# Patient Record
Sex: FEMALE | Race: WHITE | NOT HISPANIC OR LATINO | Employment: FULL TIME | URBAN - METROPOLITAN AREA
[De-identification: names, ages, dates, MRNs, and addresses within clinical notes are randomized per-mention and may not be internally consistent; named-entity substitution may affect disease eponyms.]

---

## 2023-05-01 ENCOUNTER — TELEPHONE (OUTPATIENT)
Dept: PRIMARY CARE | Facility: CLINIC | Age: 48
End: 2023-05-01
Payer: MEDICAID

## 2023-05-01 NOTE — TELEPHONE ENCOUNTER
She called in she is having a lot of left side pain around her hip and into to her back she also states that her cousin was diagnosed with Stage 3 cervical cancer. She also stated that she was on the depo shot went off of it awhile after she got a blood clot in her lungs. She wanting to know if there is any test we can order for. Please call her at [phone]

## 2023-05-17 PROBLEM — M54.9 ACUTE BACK PAIN: Status: RESOLVED | Noted: 2023-05-17 | Resolved: 2023-05-17

## 2023-05-17 PROBLEM — E66.01 MORBID OBESITY (MULTI): Status: RESOLVED | Noted: 2023-05-17 | Resolved: 2023-05-17

## 2023-05-17 PROBLEM — E55.9 VITAMIN D DEFICIENCY: Status: ACTIVE | Noted: 2023-05-17

## 2023-05-17 PROBLEM — M54.2 ACUTE NECK PAIN: Status: RESOLVED | Noted: 2023-05-17 | Resolved: 2023-05-17

## 2023-05-17 PROBLEM — R63.5 WEIGHT GAIN: Status: RESOLVED | Noted: 2023-05-17 | Resolved: 2023-05-17

## 2023-05-17 PROBLEM — E53.8 VITAMIN B12 DEFICIENCY: Status: ACTIVE | Noted: 2023-05-17

## 2023-05-17 PROBLEM — I26.99 PULMONARY EMBOLISM (MULTI): Status: RESOLVED | Noted: 2023-05-17 | Resolved: 2023-05-17

## 2023-05-17 PROBLEM — N91.2 AMENORRHEA: Status: RESOLVED | Noted: 2023-05-17 | Resolved: 2023-05-17

## 2023-05-17 PROBLEM — E88.819 INSULIN RESISTANCE: Status: ACTIVE | Noted: 2023-05-17

## 2023-05-17 PROBLEM — I89.0 LYMPHEDEMA: Status: ACTIVE | Noted: 2023-05-17

## 2023-05-17 PROBLEM — I10 BENIGN ESSENTIAL HYPERTENSION: Status: ACTIVE | Noted: 2023-05-17

## 2023-05-17 PROBLEM — E78.00 ELEVATED LDL CHOLESTEROL LEVEL: Status: ACTIVE | Noted: 2023-05-17

## 2023-05-17 PROBLEM — I82.409 DVT (DEEP VENOUS THROMBOSIS) (MULTI): Status: ACTIVE | Noted: 2023-05-17

## 2023-05-17 PROBLEM — R63.2 POLYPHAGIA: Status: RESOLVED | Noted: 2023-05-17 | Resolved: 2023-05-17

## 2023-05-17 PROBLEM — R10.2 PELVIC PAIN: Status: RESOLVED | Noted: 2023-05-17 | Resolved: 2023-05-17

## 2023-05-17 PROBLEM — R42 LIGHTHEADEDNESS: Status: ACTIVE | Noted: 2023-05-17

## 2023-05-17 PROBLEM — R06.02 SHORTNESS OF BREATH AT REST: Status: RESOLVED | Noted: 2023-05-17 | Resolved: 2023-05-17

## 2023-05-17 PROBLEM — I47.19 ATRIAL TACHYCARDIA (CMS-HCC): Status: RESOLVED | Noted: 2023-05-17 | Resolved: 2023-05-17

## 2023-05-17 PROBLEM — R05.9 COUGH: Status: RESOLVED | Noted: 2023-05-17 | Resolved: 2023-05-17

## 2023-05-17 PROBLEM — F41.1 GENERALIZED ANXIETY DISORDER: Status: ACTIVE | Noted: 2023-05-17

## 2023-05-17 RX ORDER — CYCLOBENZAPRINE HCL 5 MG
1 TABLET ORAL NIGHTLY PRN
COMMUNITY
Start: 2022-11-21 | End: 2023-05-23 | Stop reason: SDUPTHER

## 2023-05-17 RX ORDER — METOPROLOL TARTRATE 25 MG/1
1 TABLET, FILM COATED ORAL 2 TIMES DAILY
COMMUNITY
Start: 2023-01-31 | End: 2023-07-12 | Stop reason: SDUPTHER

## 2023-05-17 RX ORDER — LOSARTAN POTASSIUM 25 MG/1
1 TABLET ORAL DAILY
COMMUNITY
Start: 2023-01-31 | End: 2023-07-12 | Stop reason: SDUPTHER

## 2023-05-17 RX ORDER — HYDROCHLOROTHIAZIDE 12.5 MG/1
1 CAPSULE ORAL DAILY
COMMUNITY
Start: 2023-01-31 | End: 2023-07-12 | Stop reason: SDUPTHER

## 2023-05-17 RX ORDER — APIXABAN 5 MG/1
1 TABLET, FILM COATED ORAL 2 TIMES DAILY
COMMUNITY
End: 2023-07-14 | Stop reason: SDUPTHER

## 2023-05-17 RX ORDER — AMIODARONE HYDROCHLORIDE 200 MG/1
200 TABLET ORAL DAILY
COMMUNITY
Start: 2023-03-13 | End: 2023-12-11 | Stop reason: ALTCHOICE

## 2023-05-17 RX ORDER — FLECAINIDE ACETATE 100 MG/1
100 TABLET ORAL EVERY 12 HOURS
COMMUNITY
End: 2023-12-11

## 2023-05-17 RX ORDER — BUPROPION HYDROCHLORIDE 150 MG/1
150 TABLET ORAL EVERY MORNING
COMMUNITY
Start: 2022-01-10 | End: 2023-05-23

## 2023-05-17 RX ORDER — METFORMIN HYDROCHLORIDE 500 MG/1
1 TABLET ORAL DAILY
COMMUNITY
Start: 2022-11-15 | End: 2023-05-23

## 2023-05-23 ENCOUNTER — OFFICE VISIT (OUTPATIENT)
Dept: PRIMARY CARE | Facility: CLINIC | Age: 48
End: 2023-05-23
Payer: MEDICAID

## 2023-05-23 VITALS
HEART RATE: 66 BPM | DIASTOLIC BLOOD PRESSURE: 82 MMHG | HEIGHT: 64 IN | SYSTOLIC BLOOD PRESSURE: 120 MMHG | WEIGHT: 281 LBS | BODY MASS INDEX: 47.97 KG/M2

## 2023-05-23 DIAGNOSIS — G89.29 CHRONIC BACK PAIN, UNSPECIFIED BACK LOCATION, UNSPECIFIED BACK PAIN LATERALITY: ICD-10-CM

## 2023-05-23 DIAGNOSIS — E78.00 ELEVATED LDL CHOLESTEROL LEVEL: ICD-10-CM

## 2023-05-23 DIAGNOSIS — E53.8 VITAMIN B12 DEFICIENCY: ICD-10-CM

## 2023-05-23 DIAGNOSIS — M54.9 CHRONIC BACK PAIN, UNSPECIFIED BACK LOCATION, UNSPECIFIED BACK PAIN LATERALITY: ICD-10-CM

## 2023-05-23 DIAGNOSIS — F41.1 GENERALIZED ANXIETY DISORDER: Primary | ICD-10-CM

## 2023-05-23 DIAGNOSIS — E88.819 INSULIN RESISTANCE: ICD-10-CM

## 2023-05-23 DIAGNOSIS — Z12.11 COLON CANCER SCREENING: ICD-10-CM

## 2023-05-23 DIAGNOSIS — E55.9 VITAMIN D DEFICIENCY: ICD-10-CM

## 2023-05-23 DIAGNOSIS — I10 BENIGN ESSENTIAL HYPERTENSION: ICD-10-CM

## 2023-05-23 DIAGNOSIS — I89.0 LYMPHEDEMA: ICD-10-CM

## 2023-05-23 DIAGNOSIS — I82.4Y9 DEEP VEIN THROMBOSIS (DVT) OF PROXIMAL LOWER EXTREMITY, UNSPECIFIED CHRONICITY, UNSPECIFIED LATERALITY (MULTI): ICD-10-CM

## 2023-05-23 PROCEDURE — 99214 OFFICE O/P EST MOD 30 MIN: CPT | Performed by: CLINICAL NURSE SPECIALIST

## 2023-05-23 PROCEDURE — 3074F SYST BP LT 130 MM HG: CPT | Performed by: CLINICAL NURSE SPECIALIST

## 2023-05-23 PROCEDURE — 3008F BODY MASS INDEX DOCD: CPT | Performed by: CLINICAL NURSE SPECIALIST

## 2023-05-23 PROCEDURE — 3079F DIAST BP 80-89 MM HG: CPT | Performed by: CLINICAL NURSE SPECIALIST

## 2023-05-23 RX ORDER — BUPROPION HYDROCHLORIDE 300 MG/1
300 TABLET ORAL EVERY MORNING
Qty: 30 TABLET | Refills: 5 | Status: SHIPPED | OUTPATIENT
Start: 2023-05-23 | End: 2023-07-12 | Stop reason: SDUPTHER

## 2023-05-23 RX ORDER — METFORMIN HYDROCHLORIDE 500 MG/1
500 TABLET ORAL
Qty: 90 TABLET | Refills: 0 | Status: SHIPPED | OUTPATIENT
Start: 2023-05-23 | End: 2023-07-12 | Stop reason: SDUPTHER

## 2023-05-23 RX ORDER — CYCLOBENZAPRINE HCL 5 MG
5 TABLET ORAL NIGHTLY PRN
Qty: 30 TABLET | Refills: 2 | Status: SHIPPED | OUTPATIENT
Start: 2023-05-23 | End: 2023-08-04

## 2023-05-23 ASSESSMENT — ENCOUNTER SYMPTOMS
SORE THROAT: 0
DEPRESSION: 1
NECK PAIN: 0
OCCASIONAL FEELINGS OF UNSTEADINESS: 0
HEMATURIA: 0
LOSS OF SENSATION IN FEET: 0
SHORTNESS OF BREATH: 0
POLYDIPSIA: 0
NAUSEA: 0
FLANK PAIN: 0
TROUBLE SWALLOWING: 0
WOUND: 0
BRUISES/BLEEDS EASILY: 0
PHOTOPHOBIA: 0
CHEST TIGHTNESS: 0
SLEEP DISTURBANCE: 0
MYALGIAS: 0
CHILLS: 0
ABDOMINAL PAIN: 0
JOINT SWELLING: 0
EYE PAIN: 0
COUGH: 0
CONSTIPATION: 0
CONFUSION: 0
PALPITATIONS: 0
FEVER: 0
FATIGUE: 0
DIARRHEA: 0
UNEXPECTED WEIGHT CHANGE: 0
APPETITE CHANGE: 0
DYSURIA: 0
VOMITING: 0
SEIZURES: 0
HEADACHES: 0
BLOOD IN STOOL: 0
ACTIVITY CHANGE: 0
WHEEZING: 0
DIZZINESS: 0

## 2023-05-23 ASSESSMENT — PATIENT HEALTH QUESTIONNAIRE - PHQ9
2. FEELING DOWN, DEPRESSED OR HOPELESS: NOT AT ALL
SUM OF ALL RESPONSES TO PHQ9 QUESTIONS 1 AND 2: 1
1. LITTLE INTEREST OR PLEASURE IN DOING THINGS: NOT AT ALL
SUM OF ALL RESPONSES TO PHQ9 QUESTIONS 1 AND 2: 0
2. FEELING DOWN, DEPRESSED OR HOPELESS: SEVERAL DAYS
1. LITTLE INTEREST OR PLEASURE IN DOING THINGS: NOT AT ALL

## 2023-05-23 ASSESSMENT — COLUMBIA-SUICIDE SEVERITY RATING SCALE - C-SSRS
6. HAVE YOU EVER DONE ANYTHING, STARTED TO DO ANYTHING, OR PREPARED TO DO ANYTHING TO END YOUR LIFE?: NO
2. HAVE YOU ACTUALLY HAD ANY THOUGHTS OF KILLING YOURSELF?: NO
1. IN THE PAST MONTH, HAVE YOU WISHED YOU WERE DEAD OR WISHED YOU COULD GO TO SLEEP AND NOT WAKE UP?: NO

## 2023-05-23 NOTE — PROGRESS NOTES
Subjective   Patient ID: Kerry Garcia is a 47 y.o. female who presents for Follow-up (Follow up/Discuss lower abdominal pain. Patient states its on the left side around back on going. Ultrasound was done and it was normal ).  HPI    Here today as a follow up for her Hypertension. Has been consistent with her Losartan/HCTZ.     Still working on weight loss. No longer going to [employer]. Has been trying to work on her diet. Chronic issues with struggling to lose weight. Stopped eating fast food. Followed with Bariatrics, not sure she is going to follow back with her. Has continued her Metformin.     She has chronic issues with Peripheral Edema, previously was on Lasix but states that it made her urinate too much. Did not feel that it was beneficial for her. Denies any complaints of chest pain or shortness of breath. Lymphedema, followed with Rehab and Vascular.     Mother previously diagnosed in her 40's with Colon Cancer, Father with Polyps removed. Patient declined having a Colonoscopy done. Agreeable to Cologuard. Did not have done as ordered.     Would like to increase her Wellbutrin, increased Anxiety.     Back Pain, no improvement with OTC medications. No imaging has been completed.     Review of Systems   Constitutional:  Negative for activity change, appetite change, chills, fatigue, fever and unexpected weight change.   HENT:  Negative for ear pain, hearing loss, nosebleeds, sore throat, tinnitus and trouble swallowing.    Eyes:  Negative for photophobia, pain and visual disturbance.   Respiratory:  Negative for cough, chest tightness, shortness of breath and wheezing.    Cardiovascular:  Positive for leg swelling. Negative for chest pain and palpitations.   Gastrointestinal:  Negative for abdominal pain, blood in stool, constipation, diarrhea, nausea and vomiting.   Endocrine: Negative for cold intolerance, heat intolerance, polydipsia and polyuria.   Genitourinary:  Negative for dysuria, flank pain  and hematuria.   Musculoskeletal:  Positive for back pain. Negative for arthralgias, joint swelling, myalgias and neck pain.   Skin:  Negative for pallor, rash and wound.   Allergic/Immunologic: Negative for immunocompromised state.   Neurological:  Negative for dizziness, seizures and headaches.   Hematological:  Does not bruise/bleed easily.   Psychiatric/Behavioral:  Negative for confusion and sleep disturbance.        Objective   Physical Exam  Vitals and nursing note reviewed.   Constitutional:       General: She is not in acute distress.     Appearance: Normal appearance.   HENT:      Head: Normocephalic.      Nose: Nose normal.   Eyes:      Conjunctiva/sclera: Conjunctivae normal.   Neck:      Vascular: No carotid bruit.   Cardiovascular:      Rate and Rhythm: Normal rate and regular rhythm.      Pulses: Normal pulses.      Heart sounds: Normal heart sounds.   Pulmonary:      Effort: Pulmonary effort is normal.      Breath sounds: Normal breath sounds.   Abdominal:      General: Bowel sounds are normal.      Palpations: Abdomen is soft.   Musculoskeletal:         General: Normal range of motion.      Cervical back: Normal range of motion.   Skin:     General: Skin is warm and dry.   Neurological:      Mental Status: She is alert and oriented to person, place, and time. Mental status is at baseline.   Psychiatric:         Mood and Affect: Mood normal.         Behavior: Behavior normal.       Assessment/Plan        Updated lab work ordered at [facility] today.     #1. Lymphedema: Continue to follow with Specialist as previously determined.   #2. Obesity & #3. Weight Gain: BMI: 48.23. Followed with Bariatrics.   #4. Tobacco Use: Patient advised to quit smoking tobacco. The risks of smoking were reviewed with the patient and she was offered medication and/or smoking cessation counseling to help. Declined at this time. States that she only smokes when she drinks.  #5. Vitamin B12 Deficiency: Vitamin B12 OTC.   #6.  Hypertension: Blood pressure controlled at [facility] today. Losartan/HCTZ. Encouraged ambulatory blood pressure monitoring. DASH diet.   #7. DVT, Pulmonary Embolism: Continue Eliquis as prescribed.   #8. Back Pain: Imaging ordered. OTC pain relievers. Flexeril PRN. Will follow up pending results.   #9. Insulin Resistance: Increase Metformin. Reevaluate with next lab work.   #10. Anxiety: Increase Wellbutrin. Follow up with medication effectiveness.   #11. Elevated LDL: Lifestyle changes. Continue to monitor.   #12. Vitamin D Deficiency: Reevaluate with next lab work.     Last Mammogram: None Available. Has order from GYN.   Unable to update Immunization due to Insurance.   Cologuard reordered.

## 2023-05-24 ASSESSMENT — ENCOUNTER SYMPTOMS
ARTHRALGIAS: 0
BACK PAIN: 1

## 2023-05-26 ENCOUNTER — TELEPHONE (OUTPATIENT)
Dept: PRIMARY CARE | Facility: CLINIC | Age: 48
End: 2023-05-26
Payer: MEDICAID

## 2023-05-26 DIAGNOSIS — E66.9 OBESITY, UNSPECIFIED CLASSIFICATION, UNSPECIFIED OBESITY TYPE, UNSPECIFIED WHETHER SERIOUS COMORBIDITY PRESENT: ICD-10-CM

## 2023-05-26 DIAGNOSIS — E88.819 INSULIN RESISTANCE: ICD-10-CM

## 2023-05-26 NOTE — TELEPHONE ENCOUNTER
She called and has questions on her bipolar pill cause she is not sure what she is to double up on please call her at [phone]

## 2023-06-13 ENCOUNTER — TELEPHONE (OUTPATIENT)
Dept: PRIMARY CARE | Facility: CLINIC | Age: 48
End: 2023-06-13
Payer: MEDICAID

## 2023-06-13 DIAGNOSIS — R10.9 LEFT SIDED ABDOMINAL PAIN: ICD-10-CM

## 2023-06-13 NOTE — TELEPHONE ENCOUNTER
She would like a call back cause she is about to go in for x-ray and wants you to know that her pain in more in her colon area and to left side and the pain is like when you have your period cramps and she was very very gases yesterday and the muscle relaxer didn't touch the pain. Please call her at [phone] and she is walking with can. She don't think it is her Sciatic nerve

## 2023-06-14 RX ORDER — DICYCLOMINE HYDROCHLORIDE 20 MG/1
20 TABLET ORAL 4 TIMES DAILY PRN
Qty: 30 TABLET | Refills: 0 | Status: SHIPPED | OUTPATIENT
Start: 2023-06-14 | End: 2023-07-14 | Stop reason: SDUPTHER

## 2023-06-14 NOTE — TELEPHONE ENCOUNTER
Patient notified. Wants to know what she should do about the pain until results. Taking muscle relaxer's and Tylenol and IBU nothing is helping

## 2023-06-14 NOTE — TELEPHONE ENCOUNTER
At last visit she was noting the pain to be from her Back. Her current complaints sound more GI related. She does have some significant family history for Colon. Would recommend that she see GI and consider Colonoscopy. Thank you!

## 2023-06-15 ENCOUNTER — TELEPHONE (OUTPATIENT)
Dept: PRIMARY CARE | Facility: CLINIC | Age: 48
End: 2023-06-15
Payer: MEDICAID

## 2023-06-15 DIAGNOSIS — G89.29 OTHER CHRONIC PAIN: Primary | ICD-10-CM

## 2023-06-15 NOTE — TELEPHONE ENCOUNTER
Does she just want the results?     Did indicate some mild chronic changes but nothing acute. May benefit from trying PT. Ok to order. Thank you!

## 2023-06-16 NOTE — TELEPHONE ENCOUNTER
Called and spoke with patient. Declined Therapy. Requested Gabapentin. Medication E-Prescribed. Keep follow up appointment. Thank you!

## 2023-06-16 NOTE — TELEPHONE ENCOUNTER
Patient called back, she went to the pharmacy to  the medication and it wasn't at her pharmacy. Can you check into this, please.

## 2023-06-22 RX ORDER — GABAPENTIN 300 MG/1
300 CAPSULE ORAL NIGHTLY
Qty: 30 CAPSULE | Refills: 2 | Status: SHIPPED | OUTPATIENT
Start: 2023-06-22 | End: 2023-08-08

## 2023-07-11 ENCOUNTER — TELEPHONE (OUTPATIENT)
Dept: PRIMARY CARE | Facility: CLINIC | Age: 48
End: 2023-07-11
Payer: MEDICAID

## 2023-07-11 DIAGNOSIS — F41.1 GENERALIZED ANXIETY DISORDER: ICD-10-CM

## 2023-07-11 DIAGNOSIS — I10 BENIGN ESSENTIAL HYPERTENSION: ICD-10-CM

## 2023-07-11 DIAGNOSIS — E88.819 INSULIN RESISTANCE: ICD-10-CM

## 2023-07-11 NOTE — TELEPHONE ENCOUNTER
*voicemail    [facility] sent over a fax for the scripts that needed refilled have you gotten this?

## 2023-07-12 RX ORDER — METOPROLOL TARTRATE 25 MG/1
25 TABLET, FILM COATED ORAL 2 TIMES DAILY
Qty: 180 TABLET | Refills: 1 | Status: SHIPPED | OUTPATIENT
Start: 2023-07-12 | End: 2023-09-01

## 2023-07-12 RX ORDER — BUPROPION HYDROCHLORIDE 300 MG/1
300 TABLET ORAL EVERY MORNING
Qty: 90 TABLET | Refills: 1 | Status: SHIPPED | OUTPATIENT
Start: 2023-07-12 | End: 2023-10-31

## 2023-07-12 RX ORDER — LOSARTAN POTASSIUM 25 MG/1
25 TABLET ORAL DAILY
Qty: 90 TABLET | Refills: 1 | Status: SHIPPED | OUTPATIENT
Start: 2023-07-12 | End: 2024-02-22 | Stop reason: ALTCHOICE

## 2023-07-12 RX ORDER — METFORMIN HYDROCHLORIDE 500 MG/1
500 TABLET ORAL
Qty: 90 TABLET | Refills: 1 | Status: SHIPPED | OUTPATIENT
Start: 2023-07-12 | End: 2023-07-14 | Stop reason: SDUPTHER

## 2023-07-12 RX ORDER — HYDROCHLOROTHIAZIDE 12.5 MG/1
12.5 CAPSULE ORAL DAILY
Qty: 90 CAPSULE | Refills: 1 | Status: SHIPPED | OUTPATIENT
Start: 2023-07-12 | End: 2024-02-22 | Stop reason: ALTCHOICE

## 2023-07-12 NOTE — TELEPHONE ENCOUNTER
We dont take refill request from Pharmacy as this causing to many issues on refills. Called patient to get the name of meds she needs refilled.   Meds pending

## 2023-07-14 DIAGNOSIS — R10.9 LEFT SIDED ABDOMINAL PAIN: ICD-10-CM

## 2023-07-14 DIAGNOSIS — I82.4Y9 DEEP VEIN THROMBOSIS (DVT) OF PROXIMAL LOWER EXTREMITY, UNSPECIFIED CHRONICITY, UNSPECIFIED LATERALITY (MULTI): Primary | ICD-10-CM

## 2023-07-14 DIAGNOSIS — E88.819 INSULIN RESISTANCE: ICD-10-CM

## 2023-07-14 RX ORDER — DICYCLOMINE HYDROCHLORIDE 20 MG/1
20 TABLET ORAL 4 TIMES DAILY PRN
Qty: 30 TABLET | Refills: 0 | Status: SHIPPED | OUTPATIENT
Start: 2023-07-14 | End: 2023-07-17

## 2023-07-14 RX ORDER — METFORMIN HYDROCHLORIDE 500 MG/1
500 TABLET ORAL
Qty: 90 TABLET | Refills: 1 | Status: SHIPPED | OUTPATIENT
Start: 2023-07-14 | End: 2023-09-14

## 2023-07-17 DIAGNOSIS — R10.9 LEFT SIDED ABDOMINAL PAIN: ICD-10-CM

## 2023-07-17 RX ORDER — DICYCLOMINE HYDROCHLORIDE 20 MG/1
TABLET ORAL
Qty: 30 TABLET | Refills: 10 | Status: SHIPPED | OUTPATIENT
Start: 2023-07-17 | End: 2024-02-22 | Stop reason: ALTCHOICE

## 2023-08-03 DIAGNOSIS — G89.29 CHRONIC BACK PAIN, UNSPECIFIED BACK LOCATION, UNSPECIFIED BACK PAIN LATERALITY: ICD-10-CM

## 2023-08-03 DIAGNOSIS — M54.9 CHRONIC BACK PAIN, UNSPECIFIED BACK LOCATION, UNSPECIFIED BACK PAIN LATERALITY: ICD-10-CM

## 2023-08-04 RX ORDER — CYCLOBENZAPRINE HCL 5 MG
5 TABLET ORAL NIGHTLY PRN
Qty: 30 TABLET | Refills: 10 | Status: SHIPPED | OUTPATIENT
Start: 2023-08-04 | End: 2024-02-22 | Stop reason: ALTCHOICE

## 2023-08-07 DIAGNOSIS — G89.29 OTHER CHRONIC PAIN: ICD-10-CM

## 2023-08-08 RX ORDER — GABAPENTIN 300 MG/1
300 CAPSULE ORAL
Qty: 30 CAPSULE | Refills: 0 | Status: SHIPPED | OUTPATIENT
Start: 2023-08-08 | End: 2023-09-14

## 2023-08-23 ENCOUNTER — OFFICE VISIT (OUTPATIENT)
Dept: PRIMARY CARE | Facility: CLINIC | Age: 48
End: 2023-08-23
Payer: MEDICAID

## 2023-08-23 VITALS
WEIGHT: 277 LBS | BODY MASS INDEX: 47.29 KG/M2 | HEART RATE: 67 BPM | HEIGHT: 64 IN | DIASTOLIC BLOOD PRESSURE: 80 MMHG | SYSTOLIC BLOOD PRESSURE: 110 MMHG

## 2023-08-23 DIAGNOSIS — F41.1 GENERALIZED ANXIETY DISORDER: ICD-10-CM

## 2023-08-23 DIAGNOSIS — G89.29 CHRONIC BACK PAIN, UNSPECIFIED BACK LOCATION, UNSPECIFIED BACK PAIN LATERALITY: ICD-10-CM

## 2023-08-23 DIAGNOSIS — I89.0 LYMPHEDEMA: Primary | ICD-10-CM

## 2023-08-23 DIAGNOSIS — E78.00 ELEVATED LDL CHOLESTEROL LEVEL: ICD-10-CM

## 2023-08-23 DIAGNOSIS — E88.819 INSULIN RESISTANCE: ICD-10-CM

## 2023-08-23 DIAGNOSIS — E53.8 VITAMIN B12 DEFICIENCY: ICD-10-CM

## 2023-08-23 DIAGNOSIS — I10 BENIGN ESSENTIAL HYPERTENSION: ICD-10-CM

## 2023-08-23 DIAGNOSIS — E55.9 VITAMIN D DEFICIENCY: ICD-10-CM

## 2023-08-23 DIAGNOSIS — K59.00 CONSTIPATION, UNSPECIFIED CONSTIPATION TYPE: ICD-10-CM

## 2023-08-23 DIAGNOSIS — M54.9 CHRONIC BACK PAIN, UNSPECIFIED BACK LOCATION, UNSPECIFIED BACK PAIN LATERALITY: ICD-10-CM

## 2023-08-23 DIAGNOSIS — Z12.11 COLON CANCER SCREENING: ICD-10-CM

## 2023-08-23 PROCEDURE — 4004F PT TOBACCO SCREEN RCVD TLK: CPT | Performed by: CLINICAL NURSE SPECIALIST

## 2023-08-23 PROCEDURE — 3079F DIAST BP 80-89 MM HG: CPT | Performed by: CLINICAL NURSE SPECIALIST

## 2023-08-23 PROCEDURE — 3074F SYST BP LT 130 MM HG: CPT | Performed by: CLINICAL NURSE SPECIALIST

## 2023-08-23 PROCEDURE — 3008F BODY MASS INDEX DOCD: CPT | Performed by: CLINICAL NURSE SPECIALIST

## 2023-08-23 PROCEDURE — 99214 OFFICE O/P EST MOD 30 MIN: CPT | Performed by: CLINICAL NURSE SPECIALIST

## 2023-08-23 RX ORDER — LACTULOSE 10 G/15ML
10 SOLUTION ORAL DAILY
Qty: 450 ML | Refills: 0 | Status: SHIPPED | OUTPATIENT
Start: 2023-08-23 | End: 2023-09-22

## 2023-08-23 ASSESSMENT — ENCOUNTER SYMPTOMS
WOUND: 0
ABDOMINAL PAIN: 0
CHILLS: 0
WEAKNESS: 0
DYSURIA: 0
MYALGIAS: 0
POLYDIPSIA: 0
OCCASIONAL FEELINGS OF UNSTEADINESS: 0
DEPRESSION: 1
FEVER: 0
LOSS OF SENSATION IN FEET: 0
DIARRHEA: 0
VOMITING: 0
UNEXPECTED WEIGHT CHANGE: 0
FATIGUE: 0
BACK PAIN: 1
CHEST TIGHTNESS: 0
NAUSEA: 0
COUGH: 0
WEIGHT LOSS: 0
ARTHRALGIAS: 0
NECK PAIN: 0
SORE THROAT: 0
WHEEZING: 0
CONSTIPATION: 1
BRUISES/BLEEDS EASILY: 0
LEG PAIN: 0
BLOOD IN STOOL: 0
SLEEP DISTURBANCE: 0
SEIZURES: 0
JOINT SWELLING: 0
PALPITATIONS: 0
SHORTNESS OF BREATH: 0
PERIANAL NUMBNESS: 0
CONFUSION: 0
DIZZINESS: 0
BOWEL INCONTINENCE: 0
TINGLING: 0
HEMATURIA: 0
HEADACHES: 0
PARESTHESIAS: 0
APPETITE CHANGE: 0
TROUBLE SWALLOWING: 0
NUMBNESS: 0
PARESIS: 0
PHOTOPHOBIA: 0
ACTIVITY CHANGE: 0
EYE PAIN: 0
FLANK PAIN: 0

## 2023-08-23 ASSESSMENT — PATIENT HEALTH QUESTIONNAIRE - PHQ9
2. FEELING DOWN, DEPRESSED OR HOPELESS: SEVERAL DAYS
1. LITTLE INTEREST OR PLEASURE IN DOING THINGS: NOT AT ALL
SUM OF ALL RESPONSES TO PHQ9 QUESTIONS 1 AND 2: 1

## 2023-08-23 NOTE — PROGRESS NOTES
Subjective   Patient ID: Kerry Garcia is a 47 y.o. female who presents for Follow-up (Follow up).  Back Pain  This is a recurrent problem. The current episode started more than 1 month ago. The problem occurs intermittently. The problem is unchanged. The pain is present in the lumbar spine. The quality of the pain is described as shooting and stabbing. The pain radiates to the left thigh. The pain is at a severity of 3/10. The pain is The same all the time. The symptoms are aggravated by bending, lying down and twisting. Stiffness is present All day. Associated symptoms include pelvic pain. Pertinent negatives include no abdominal pain, bladder incontinence, bowel incontinence, chest pain, dysuria, fever, headaches, leg pain, numbness, paresis, paresthesias, perianal numbness, tingling, weakness or weight loss. Risk factors include lack of exercise, menopause, obesity, poor posture and recent trauma.       Here today as a follow up for her Hypertension. Has been consistent with her Losartan/HCTZ.      Still working on weight loss. No longer going to "[employer]. Has been trying to work on her diet. Chronic issues with struggling to lose weight. Stopped eating fast food. Followed with Bariatrics, not sure she is going to follow back with her. Has continued her Metformin.      She has chronic issues with Peripheral Edema, previously was on Lasix but states that it made her urinate too much. Did not feel that it was beneficial for her. Denies any complaints of chest pain or shortness of breath. Lymphedema, followed with Rehab and Vascular.      Mother previously diagnosed in her 40's with Colon Cancer, Father with Polyps removed. Patient declined having a Colonoscopy done. Agreeable to Cologuard. Did not have done as ordered.      Continue her Wellbutrin.      Back Pain, no improvement with OTC medications. No imaging has been completed.    Constipation, no improvement with OTC medications, Miralax or Dulcolax.       Review of Systems   Constitutional:  Negative for activity change, appetite change, chills, fatigue, fever, unexpected weight change and weight loss.   HENT:  Negative for ear pain, hearing loss, nosebleeds, sore throat, tinnitus and trouble swallowing.    Eyes:  Negative for photophobia, pain and visual disturbance.   Respiratory:  Negative for cough, chest tightness, shortness of breath and wheezing.    Cardiovascular:  Negative for chest pain, palpitations and leg swelling.   Gastrointestinal:  Positive for constipation. Negative for abdominal pain, blood in stool, bowel incontinence, diarrhea, nausea and vomiting.   Endocrine: Negative for cold intolerance, heat intolerance, polydipsia and polyuria.   Genitourinary:  Positive for pelvic pain. Negative for bladder incontinence, dysuria, flank pain and hematuria.   Musculoskeletal:  Positive for back pain. Negative for arthralgias, joint swelling, myalgias and neck pain.   Skin:  Negative for pallor, rash and wound.   Allergic/Immunologic: Negative for immunocompromised state.   Neurological:  Negative for dizziness, tingling, seizures, weakness, numbness, headaches and paresthesias.   Hematological:  Does not bruise/bleed easily.   Psychiatric/Behavioral:  Negative for confusion and sleep disturbance.        Objective   Physical Exam  Vitals and nursing note reviewed.   Constitutional:       General: She is not in acute distress.     Appearance: Normal appearance.   HENT:      Head: Normocephalic.      Nose: Nose normal.   Eyes:      Conjunctiva/sclera: Conjunctivae normal.   Neck:      Vascular: No carotid bruit.   Cardiovascular:      Rate and Rhythm: Normal rate and regular rhythm.      Pulses: Normal pulses.   Pulmonary:      Effort: Pulmonary effort is normal.      Breath sounds: Normal breath sounds.   Abdominal:      General: Bowel sounds are normal.      Palpations: Abdomen is soft.   Musculoskeletal:         General: Normal range of motion.       Cervical back: Normal range of motion.   Skin:     General: Skin is warm and dry.   Neurological:      Mental Status: She is alert and oriented to person, place, and time. Mental status is at baseline.   Psychiatric:         Mood and Affect: Mood normal.         Behavior: Behavior normal.         Assessment/Plan       Reviewed results of blood work completed with patient.      #1. Lymphedema: Continue to follow with Specialist as previously determined.   #2. Obesity & #3. Weight Gain: BMI: 47.55. Followed with Bariatrics.   #4. Tobacco Use: Patient advised to quit smoking tobacco. The risks of smoking were reviewed with the patient and she was offered medication and/or smoking cessation counseling to help. Declined at this time. States that she only smokes when she drinks.  #5. Vitamin B12 Deficiency: Vitamin B12 OTC.   #6. Hypertension: Blood pressure controlled at [facility] today. Losartan/HCTZ. Encouraged ambulatory blood pressure monitoring. DASH diet.   #7. DVT, Pulmonary Embolism: Continue Eliquis as prescribed.   #8. Back Pain: Imaging ordered. OTC pain relievers. Flexeril PRN. Will follow up pending results.   #9. Insulin Resistance: Continue Metformin. Reevaluate with next lab work.   #10. Anxiety: Continue Wellbutrin. Follow up with medication effectiveness.   #11. Elevated LDL: Lifestyle changes. Continue to monitor.   #12. Vitamin D Deficiency: Reevaluate with next lab work.   #13. Constipation: Medication as prescribed.      Last Mammogram: None Available. New order provided at [facility] today.   Unable to update Immunization due to Insurance.   Cologuard reordered.

## 2023-08-23 NOTE — PROGRESS NOTES
Answers submitted by the patient for this visit:  Back Pain Questionnaire (Submitted on 8/23/2023)  Chief Complaint: Back pain  Chronicity: recurrent  Onset: more than 1 month ago  Frequency: intermittently  Progression since onset: unchanged  Pain location: lumbar spine  Pain quality: shooting, stabbing  Radiates to: left thigh  Pain - numeric: 3/10  Pain is: the same all the time  Aggravated by: bending, lying down, twisting  Stiffness is present: all day  abdominal pain: No  bladder incontinence: No  bowel incontinence: No  chest pain: No  dysuria: No  fever: No  headaches: No  leg pain: No  numbness: No  paresis: No  paresthesias: No  pelvic pain: Yes  perianal numbness: No  tingling: No  weakness: No  weight loss: No  Risk factors: lack of exercise, menopause, obesity, poor posture, recent trauma

## 2023-08-30 DIAGNOSIS — I10 BENIGN ESSENTIAL HYPERTENSION: ICD-10-CM

## 2023-09-01 RX ORDER — LOSARTAN POTASSIUM AND HYDROCHLOROTHIAZIDE 12.5; 5 MG/1; MG/1
1 TABLET ORAL DAILY
Qty: 30 TABLET | Refills: 10 | Status: SHIPPED | OUTPATIENT
Start: 2023-09-01

## 2023-09-01 RX ORDER — METOPROLOL TARTRATE 25 MG/1
25 TABLET, FILM COATED ORAL 2 TIMES DAILY
Qty: 60 TABLET | Refills: 10 | Status: SHIPPED | OUTPATIENT
Start: 2023-09-01

## 2023-09-14 DIAGNOSIS — G89.29 OTHER CHRONIC PAIN: ICD-10-CM

## 2023-09-14 DIAGNOSIS — E88.819 INSULIN RESISTANCE: ICD-10-CM

## 2023-09-14 RX ORDER — GABAPENTIN 300 MG/1
300 CAPSULE ORAL
Qty: 30 CAPSULE | Refills: 10 | Status: SHIPPED | OUTPATIENT
Start: 2023-09-14

## 2023-09-14 RX ORDER — METFORMIN HYDROCHLORIDE 500 MG/1
TABLET ORAL
Qty: 60 TABLET | Refills: 10 | Status: SHIPPED | OUTPATIENT
Start: 2023-09-14

## 2023-10-30 DIAGNOSIS — F41.1 GENERALIZED ANXIETY DISORDER: ICD-10-CM

## 2023-10-31 RX ORDER — BUPROPION HYDROCHLORIDE 300 MG/1
TABLET ORAL
Qty: 30 TABLET | Refills: 2 | Status: SHIPPED | OUTPATIENT
Start: 2023-10-31 | End: 2024-02-12

## 2023-11-28 ENCOUNTER — APPOINTMENT (OUTPATIENT)
Dept: PRIMARY CARE | Facility: CLINIC | Age: 48
End: 2023-11-28
Payer: MEDICAID

## 2023-11-29 DIAGNOSIS — I47.19 ATRIAL TACHYCARDIA (CMS-HCC): Primary | ICD-10-CM

## 2023-12-11 RX ORDER — FLECAINIDE ACETATE 100 MG/1
100 TABLET ORAL 2 TIMES DAILY
Qty: 60 TABLET | Refills: 0 | Status: SHIPPED | OUTPATIENT
Start: 2023-12-11 | End: 2024-02-01

## 2023-12-13 DIAGNOSIS — I82.4Y9 DEEP VEIN THROMBOSIS (DVT) OF PROXIMAL LOWER EXTREMITY, UNSPECIFIED CHRONICITY, UNSPECIFIED LATERALITY (MULTI): ICD-10-CM

## 2023-12-13 RX ORDER — APIXABAN 5 MG/1
5 TABLET, FILM COATED ORAL 2 TIMES DAILY
Qty: 60 TABLET | Refills: 10 | Status: SHIPPED | OUTPATIENT
Start: 2023-12-13

## 2023-12-29 DIAGNOSIS — I47.19 ATRIAL TACHYCARDIA (CMS-HCC): ICD-10-CM

## 2024-01-20 RX ORDER — FLECAINIDE ACETATE 100 MG/1
100 TABLET ORAL 2 TIMES DAILY
Qty: 60 TABLET | Refills: 10 | OUTPATIENT
Start: 2024-01-20 | End: 2024-02-19

## 2024-01-31 DIAGNOSIS — I47.19 ATRIAL TACHYCARDIA (CMS-HCC): ICD-10-CM

## 2024-02-01 RX ORDER — FLECAINIDE ACETATE 100 MG/1
100 TABLET ORAL 2 TIMES DAILY
Qty: 60 TABLET | Refills: 0 | Status: SHIPPED | OUTPATIENT
Start: 2024-02-01 | End: 2024-03-15 | Stop reason: SDUPTHER

## 2024-02-12 ENCOUNTER — PATIENT MESSAGE (OUTPATIENT)
Dept: PRIMARY CARE | Facility: CLINIC | Age: 49
End: 2024-02-12
Payer: MEDICAID

## 2024-02-12 DIAGNOSIS — F41.1 GENERALIZED ANXIETY DISORDER: ICD-10-CM

## 2024-02-12 RX ORDER — BUPROPION HYDROCHLORIDE 300 MG/1
TABLET ORAL
Qty: 30 TABLET | Refills: 10 | Status: SHIPPED | OUTPATIENT
Start: 2024-02-12 | End: 2024-02-13 | Stop reason: SDUPTHER

## 2024-02-13 RX ORDER — BUPROPION HYDROCHLORIDE 300 MG/1
TABLET ORAL
Qty: 90 TABLET | Refills: 3 | Status: SHIPPED | OUTPATIENT
Start: 2024-02-13

## 2024-02-22 ENCOUNTER — OFFICE VISIT (OUTPATIENT)
Dept: PRIMARY CARE | Facility: CLINIC | Age: 49
End: 2024-02-22
Payer: MEDICAID

## 2024-02-22 ENCOUNTER — LAB (OUTPATIENT)
Dept: LAB | Facility: LAB | Age: 49
End: 2024-02-22
Payer: MEDICAID

## 2024-02-22 VITALS
HEART RATE: 66 BPM | WEIGHT: 283 LBS | BODY MASS INDEX: 48.32 KG/M2 | DIASTOLIC BLOOD PRESSURE: 80 MMHG | HEIGHT: 64 IN | SYSTOLIC BLOOD PRESSURE: 120 MMHG

## 2024-02-22 DIAGNOSIS — M54.9 CHRONIC BACK PAIN, UNSPECIFIED BACK LOCATION, UNSPECIFIED BACK PAIN LATERALITY: ICD-10-CM

## 2024-02-22 DIAGNOSIS — Z12.11 COLON CANCER SCREENING: ICD-10-CM

## 2024-02-22 DIAGNOSIS — I89.0 LYMPHEDEMA: ICD-10-CM

## 2024-02-22 DIAGNOSIS — G89.29 CHRONIC BACK PAIN, UNSPECIFIED BACK LOCATION, UNSPECIFIED BACK PAIN LATERALITY: ICD-10-CM

## 2024-02-22 DIAGNOSIS — E88.819 INSULIN RESISTANCE: ICD-10-CM

## 2024-02-22 DIAGNOSIS — Z12.31 VISIT FOR SCREENING MAMMOGRAM: Primary | ICD-10-CM

## 2024-02-22 DIAGNOSIS — F41.1 GENERALIZED ANXIETY DISORDER: ICD-10-CM

## 2024-02-22 DIAGNOSIS — E55.9 VITAMIN D DEFICIENCY: ICD-10-CM

## 2024-02-22 DIAGNOSIS — I10 BENIGN ESSENTIAL HYPERTENSION: ICD-10-CM

## 2024-02-22 DIAGNOSIS — K59.00 CONSTIPATION, UNSPECIFIED CONSTIPATION TYPE: ICD-10-CM

## 2024-02-22 DIAGNOSIS — E78.00 ELEVATED LDL CHOLESTEROL LEVEL: ICD-10-CM

## 2024-02-22 DIAGNOSIS — E53.8 VITAMIN B12 DEFICIENCY: ICD-10-CM

## 2024-02-22 LAB
ALBUMIN SERPL BCP-MCNC: 4.2 G/DL (ref 3.4–5)
ALP SERPL-CCNC: 81 U/L (ref 33–110)
ALT SERPL W P-5'-P-CCNC: 37 U/L (ref 7–45)
ANION GAP SERPL CALC-SCNC: 12 MMOL/L (ref 10–20)
AST SERPL W P-5'-P-CCNC: 23 U/L (ref 9–39)
BILIRUB SERPL-MCNC: 0.7 MG/DL (ref 0–1.2)
BUN SERPL-MCNC: 14 MG/DL (ref 6–23)
CALCIUM SERPL-MCNC: 9.2 MG/DL (ref 8.6–10.3)
CHLORIDE SERPL-SCNC: 103 MMOL/L (ref 98–107)
CHOLEST SERPL-MCNC: 232 MG/DL (ref 0–199)
CHOLESTEROL/HDL RATIO: 5.5
CO2 SERPL-SCNC: 25 MMOL/L (ref 21–32)
CREAT SERPL-MCNC: 0.86 MG/DL (ref 0.5–1.05)
EGFRCR SERPLBLD CKD-EPI 2021: 82 ML/MIN/1.73M*2
ERYTHROCYTE [DISTWIDTH] IN BLOOD BY AUTOMATED COUNT: 12.2 % (ref 11.5–14.5)
GLUCOSE SERPL-MCNC: 81 MG/DL (ref 74–99)
HCT VFR BLD AUTO: 41.3 % (ref 36–46)
HDLC SERPL-MCNC: 42.2 MG/DL
HGB BLD-MCNC: 13.8 G/DL (ref 12–16)
LDLC SERPL CALC-MCNC: 152 MG/DL
MCH RBC QN AUTO: 31.1 PG (ref 26–34)
MCHC RBC AUTO-ENTMCNC: 33.4 G/DL (ref 32–36)
MCV RBC AUTO: 93 FL (ref 80–100)
NON HDL CHOLESTEROL: 190 MG/DL (ref 0–149)
NRBC BLD-RTO: 0 /100 WBCS (ref 0–0)
PLATELET # BLD AUTO: 253 X10*3/UL (ref 150–450)
POTASSIUM SERPL-SCNC: 4.2 MMOL/L (ref 3.5–5.3)
PROT SERPL-MCNC: 7.1 G/DL (ref 6.4–8.2)
RBC # BLD AUTO: 4.44 X10*6/UL (ref 4–5.2)
SODIUM SERPL-SCNC: 136 MMOL/L (ref 136–145)
TRIGL SERPL-MCNC: 188 MG/DL (ref 0–149)
TSH SERPL-ACNC: 1.78 MIU/L (ref 0.44–3.98)
VIT B12 SERPL-MCNC: 168 PG/ML (ref 211–911)
VLDL: 38 MG/DL (ref 0–40)
WBC # BLD AUTO: 7.3 X10*3/UL (ref 4.4–11.3)

## 2024-02-22 PROCEDURE — 84443 ASSAY THYROID STIM HORMONE: CPT

## 2024-02-22 PROCEDURE — 99214 OFFICE O/P EST MOD 30 MIN: CPT | Performed by: CLINICAL NURSE SPECIALIST

## 2024-02-22 PROCEDURE — 82607 VITAMIN B-12: CPT

## 2024-02-22 PROCEDURE — 85027 COMPLETE CBC AUTOMATED: CPT

## 2024-02-22 PROCEDURE — 36415 COLL VENOUS BLD VENIPUNCTURE: CPT

## 2024-02-22 PROCEDURE — 3074F SYST BP LT 130 MM HG: CPT | Performed by: CLINICAL NURSE SPECIALIST

## 2024-02-22 PROCEDURE — 83525 ASSAY OF INSULIN: CPT

## 2024-02-22 PROCEDURE — 83036 HEMOGLOBIN GLYCOSYLATED A1C: CPT

## 2024-02-22 PROCEDURE — 80061 LIPID PANEL: CPT

## 2024-02-22 PROCEDURE — 80053 COMPREHEN METABOLIC PANEL: CPT

## 2024-02-22 PROCEDURE — 3079F DIAST BP 80-89 MM HG: CPT | Performed by: CLINICAL NURSE SPECIALIST

## 2024-02-22 ASSESSMENT — ENCOUNTER SYMPTOMS
BACK PAIN: 1
DYSURIA: 0
ACTIVITY CHANGE: 0
SEIZURES: 0
LOSS OF SENSATION IN FEET: 0
WHEEZING: 0
SORE THROAT: 0
DEPRESSION: 0
EYE PAIN: 0
HEMATURIA: 0
JOINT SWELLING: 0
TROUBLE SWALLOWING: 0
BRUISES/BLEEDS EASILY: 0
CONSTIPATION: 1
SHORTNESS OF BREATH: 0
CHILLS: 0
MYALGIAS: 0
HEADACHES: 0
NAUSEA: 0
UNEXPECTED WEIGHT CHANGE: 0
FLANK PAIN: 0
ABDOMINAL PAIN: 0
POLYDIPSIA: 0
PHOTOPHOBIA: 0
ARTHRALGIAS: 1
WOUND: 0
FATIGUE: 0
FEVER: 0
CONFUSION: 0
PALPITATIONS: 0
COUGH: 0
CHEST TIGHTNESS: 0
DIARRHEA: 0
SLEEP DISTURBANCE: 0
BLOOD IN STOOL: 0
DIZZINESS: 0
APPETITE CHANGE: 0
VOMITING: 0
NECK PAIN: 0
OCCASIONAL FEELINGS OF UNSTEADINESS: 0

## 2024-02-22 ASSESSMENT — COLUMBIA-SUICIDE SEVERITY RATING SCALE - C-SSRS
1. IN THE PAST MONTH, HAVE YOU WISHED YOU WERE DEAD OR WISHED YOU COULD GO TO SLEEP AND NOT WAKE UP?: NO
6. HAVE YOU EVER DONE ANYTHING, STARTED TO DO ANYTHING, OR PREPARED TO DO ANYTHING TO END YOUR LIFE?: NO
2. HAVE YOU ACTUALLY HAD ANY THOUGHTS OF KILLING YOURSELF?: NO

## 2024-02-22 ASSESSMENT — PATIENT HEALTH QUESTIONNAIRE - PHQ9
2. FEELING DOWN, DEPRESSED OR HOPELESS: NOT AT ALL
SUM OF ALL RESPONSES TO PHQ9 QUESTIONS 1 AND 2: 0
1. LITTLE INTEREST OR PLEASURE IN DOING THINGS: NOT AT ALL

## 2024-02-22 NOTE — PROGRESS NOTES
Subjective   Patient ID: Kerry Garcia is a 48 y.o. female who presents for Follow-up (Follow up).  HPI    Here today as a follow up for her Hypertension. Has been consistent with her Losartan/HCTZ.     Still working on weight loss. No longer going to [employer]. Has been trying to work on her diet. Chronic issues with struggling to lose weight. Stopped eating fast food. Followed with Bariatrics, not sure she is going to follow back with her. Has continued her Metformin. States that she has updated her home gym and trying to be more active.      She has chronic issues with Peripheral Edema, previously was on Lasix but states that it made her urinate too much. Did not feel that it was beneficial for her. Denies any complaints of chest pain or shortness of breath. Lymphedema, followed with Rehab and Vascular.      Mother previously diagnosed in her 40's with Colon Cancer, Father with Polyps removed. Patient declined having a Colonoscopy done. Cologuard negative.      Continue her Wellbutrin.      Back Pain, no improvement with OTC medications. No imaging has been completed.     Constipation, no improvement with OTC medications, Miralax or Dulcolax.    Review of Systems   Constitutional:  Negative for activity change, appetite change, chills, fatigue, fever and unexpected weight change.   HENT:  Negative for ear pain, hearing loss, nosebleeds, sore throat, tinnitus and trouble swallowing.    Eyes:  Negative for photophobia, pain and visual disturbance.   Respiratory:  Negative for cough, chest tightness, shortness of breath and wheezing.    Cardiovascular:  Positive for leg swelling. Negative for chest pain and palpitations.   Gastrointestinal:  Positive for constipation. Negative for abdominal pain, blood in stool, diarrhea, nausea and vomiting.   Endocrine: Negative for cold intolerance, heat intolerance, polydipsia and polyuria.   Genitourinary:  Negative for dysuria, flank pain and hematuria.   Musculoskeletal:   Positive for arthralgias and back pain. Negative for joint swelling, myalgias and neck pain.   Skin:  Negative for pallor, rash and wound.   Allergic/Immunologic: Negative for immunocompromised state.   Neurological:  Negative for dizziness, seizures and headaches.   Hematological:  Does not bruise/bleed easily.   Psychiatric/Behavioral:  Negative for confusion and sleep disturbance.        Objective   Physical Exam  Vitals and nursing note reviewed.   Constitutional:       General: She is not in acute distress.     Appearance: Normal appearance.   HENT:      Head: Normocephalic.      Nose: Nose normal.   Eyes:      Conjunctiva/sclera: Conjunctivae normal.   Neck:      Vascular: No carotid bruit.   Cardiovascular:      Rate and Rhythm: Normal rate and regular rhythm.      Pulses: Normal pulses.      Heart sounds: Normal heart sounds.   Pulmonary:      Effort: Pulmonary effort is normal.      Breath sounds: Normal breath sounds.   Abdominal:      General: Bowel sounds are normal.      Palpations: Abdomen is soft.   Musculoskeletal:         General: Normal range of motion.      Cervical back: Normal range of motion.   Skin:     General: Skin is warm and dry.   Neurological:      Mental Status: She is alert and oriented to person, place, and time. Mental status is at baseline.   Psychiatric:         Mood and Affect: Mood normal.         Behavior: Behavior normal.       Assessment/Plan        Reviewed results of blood work completed with patient.      #1. Lymphedema: Continue to follow with Specialist as previously determined.   #2. Obesity & #3. Weight Gain: BMI: 48.58. Followed with Bariatrics.   #4. Tobacco Use: Patient advised to quit smoking tobacco. The risks of smoking were reviewed with the patient and she was offered medication and/or smoking cessation counseling to help. Declined at this time. States that she only smokes when she drinks. Vaping.   #5. Vitamin B12 Deficiency: Vitamin B12 OTC.   #6.  Hypertension: Blood pressure controlled at [facility] today. Losartan/HCTZ. Encouraged ambulatory blood pressure monitoring. DASH diet.   #7. DVT, Pulmonary Embolism: Continue Eliquis as prescribed.   #8. Back Pain: OTC pain relievers. Flexeril PRN.   #9. Insulin Resistance: Continue Metformin. Reevaluate with next lab work.   #10. Anxiety: Continue Wellbutrin. Follow up with medication effectiveness.   #11. Elevated LDL: Lifestyle changes. Continue to monitor.   #12. Vitamin D Deficiency: Reevaluate with next lab work.   #13. Constipation: Medication as prescribed.      Last Mammogram: None Available. New order provided at [facility] today.   Unable to update Immunization due to Insurance.   Cologuard: August 2023, negative.     Jacqueline Mullins, APRN-CNS 02/22/24 2:23 PM

## 2024-02-23 LAB
EST. AVERAGE GLUCOSE BLD GHB EST-MCNC: 105 MG/DL
HBA1C MFR BLD: 5.3 %
INSULIN P FAST SERPL-ACNC: 14 UIU/ML (ref 3–25)

## 2024-02-26 ENCOUNTER — TELEPHONE (OUTPATIENT)
Dept: CARDIOLOGY | Facility: CLINIC | Age: 49
End: 2024-02-26

## 2024-02-26 ENCOUNTER — TELEPHONE (OUTPATIENT)
Dept: PRIMARY CARE | Facility: CLINIC | Age: 49
End: 2024-02-26

## 2024-02-26 NOTE — TELEPHONE ENCOUNTER
----- Message from CLAUDETTE Paez-CNS sent at 2/25/2024  9:04 PM EST -----  Please call patient with lab results. Vitamin B12 is low. Start/increase Vitamin B12 and/or consider injections. Insulin level has shown improvement. Cholesterol is worse, would consider prescription therapy. Repeat CMP, Lipids, and B12 prior to follow up appointment. Thank you!

## 2024-02-26 NOTE — TELEPHONE ENCOUNTER
Patient LVM at the office to r/s her missed appt this AM with Anastasia. LVM for patient to call the office again to r/s - Sukhwinder ALTAMIRANO

## 2024-02-28 DIAGNOSIS — I47.19 ATRIAL TACHYCARDIA (CMS-HCC): ICD-10-CM

## 2024-02-29 ENCOUNTER — APPOINTMENT (OUTPATIENT)
Dept: CARDIOLOGY | Facility: CLINIC | Age: 49
End: 2024-02-29
Payer: MEDICAID

## 2024-03-06 ENCOUNTER — TELEPHONE (OUTPATIENT)
Dept: PRIMARY CARE | Facility: CLINIC | Age: 49
End: 2024-03-06
Payer: MEDICAID

## 2024-03-06 NOTE — TELEPHONE ENCOUNTER
Asha dropped off the paperwork from [facility] to have her teeth extracted. She would like it filled out and faxed as soon as possible. I put the paperwork in Jacqueline's mailbox. Jacqueline can sign the form, but is has to be signed by a doctor as well.  Please call her when completed.

## 2024-03-06 NOTE — TELEPHONE ENCOUNTER
Patient lst seen 2/22/2024 upcoming 8/2024. Form placed on verónica desk to determine if she needs an apt

## 2024-03-15 DIAGNOSIS — I47.19 ATRIAL TACHYCARDIA (CMS-HCC): ICD-10-CM

## 2024-03-15 RX ORDER — FLECAINIDE ACETATE 100 MG/1
100 TABLET ORAL 2 TIMES DAILY
Qty: 60 TABLET | Refills: 0 | Status: SHIPPED | OUTPATIENT
Start: 2024-03-15 | End: 2024-04-05 | Stop reason: SDUPTHER

## 2024-03-15 RX ORDER — FLECAINIDE ACETATE 100 MG/1
100 TABLET ORAL 2 TIMES DAILY
Qty: 60 TABLET | Refills: 10 | OUTPATIENT
Start: 2024-03-15

## 2024-03-28 DIAGNOSIS — I47.19 ATRIAL TACHYCARDIA (CMS-HCC): ICD-10-CM

## 2024-04-02 NOTE — PROGRESS NOTES
Electrophysiology Follow up Visit    Kerry Garcia is a 48 y.o. year old female patient with     1. Morbid obesity   2. Hypertension    3. JOSE   4. DM type II   5. DVT/PE:              6. Paroxysmal atrial fibrillation: presented to [facility] in January 2023 with rapid palpitations, noted to be going in and out of AF. Started on amiodarone and discharged home. We saw patient in March 2023. Echocardiogram showed normal LV function and normal left atrium. We opted to discontinue amiodarone due to potential long term side effects and started patient on flecainide for atrial fibrillation suppression.    Patient here for follow up for atrial fibrillation and flecainide monitoring. She reports she has been feeling well with no palpitations, SOB, lightheadedness, syncope. She takes her medications most days though does forget evening doses. She denies any bleeding concerns on Eliquis.    Medical History  She has a past medical history of Amenorrhea (05/17/2023), Body mass index (BMI)40.0-44.9, adult (10/19/2020), Cervicalgia (05/31/2022), Chronic cough (05/09/2016), Cough (05/17/2023), Encounter for follow-up examination after completed treatment for conditions other than malignant neoplasm (03/18/2021), Localized edema (10/29/2020), Other conditions influencing health status (04/12/2022), Other conditions influencing health status (12/28/2021), Pelvic pain (05/17/2023), Personal history of other diseases of the nervous system and sense organs, Personal history of other diseases of the respiratory system (05/09/2016), Personal history of other endocrine, nutritional and metabolic disease (08/06/2021), Personal history of other endocrine, nutritional and metabolic disease (05/17/2022), Personal history of other mental and behavioral disorders, Personal history of other specified conditions, and Polyphagia (05/17/2023).    Social History  She reports that she has been smoking cigarettes. She has a 1.00 pack-year  "smoking history. She has never used smokeless tobacco. She reports current alcohol use of about 10.0 standard drinks of alcohol per week. She reports that she does not use drugs.      FamHx:   Family History   Problem Relation Name Age of Onset    Diabetes Mother Kitty salgado     Cancer Mother Kitty salgado     Anxiety disorder Mother Kitty salgado        Allergies   Allergen Reactions    Hydrocodone-Acetaminophen Unknown        Outpatient Medications:  Current Outpatient Medications   Medication Instructions    buPROPion XL (Wellbutrin XL) 300 mg 24 hr tablet TAKE 1 TABLET BY MOUTH EVERY DAY IN THE MORNING *DO NOT CRUSH, CHEW OR SPLIT*    cetirizine (ZyrTEC) 10 mg tablet oral, Daily RT    Eliquis 5 mg, oral, 2 times daily    flecainide (TAMBOCOR) 100 mg, oral, 2 times daily    gabapentin (NEURONTIN) 300 mg, oral    lisinopriL-hydrochlorothiazide 10-12.5 mg tablet oral, Daily RT    losartan-hydrochlorothiazide (Hyzaar) 50-12.5 mg tablet 1 tablet, oral, Daily    metFORMIN (Glucophage) 500 mg tablet TAKE ONE (1) TABLET BY MOUTH TWICE DAILY WITH MEALS    metoprolol tartrate (LOPRESSOR) 25 mg, oral, 2 times daily         Last Recorded Vitals: .vital      3/30/2023    12:13 PM 5/3/2023     3:02 PM 5/23/2023     9:35 AM 5/23/2023    10:02 AM 8/23/2023     2:15 PM 2/22/2024     2:21 PM 4/5/2024    10:36 AM   Vitals   Systolic 132 140 124 120 110 120 140   Diastolic 90 100 98 82 80 80 100   Heart Rate 84  66  67 66 72   Resp 16         Height (in) 1.626 m (5' 4\")  1.626 m (5' 4\")  1.626 m (5' 4\") 1.626 m (5' 4\") 1.626 m (5' 4\")   Weight (lb) 280 272 281  277 283 287.2   BMI 48.06 kg/m2 46.69 kg/m2 48.23 kg/m2  47.55 kg/m2 48.58 kg/m2 49.3 kg/m2   BSA (m2) 2.39 m2 2.36 m2 2.39 m2  2.39 m2 2.4 m2 2.42 m2   Visit Report   Report Report Report Report Report    Visit Vitals  BP (!) 140/100 (BP Location: Left arm, Patient Position: Sitting, BP Cuff Size: Large adult)   Pulse 72   Ht 1.626 m (5' 4\")   Wt 130 kg (287 lb 3.2 oz)   SpO2 96% "   BMI 49.30 kg/m²   Smoking Status Some Days   BSA 2.42 m²        Physical Exam  Exam conducted with a chaperone present.   Constitutional:       Appearance: Normal appearance.   Cardiovascular:      Rate and Rhythm: Normal rate and regular rhythm.      Pulses: Normal pulses.      Heart sounds: Normal heart sounds.   Pulmonary:      Effort: Pulmonary effort is normal.      Breath sounds: Normal breath sounds.   Musculoskeletal:         General: Normal range of motion.      Right lower leg: Edema present.      Left lower leg: Edema present.   Skin:     General: Skin is warm and dry.   Neurological:      Mental Status: She is alert and oriented to person, place, and time.   Psychiatric:         Mood and Affect: Mood normal.        Cardiac Testing Reviewed Study(s):  Echo(January/2023):   CONCLUSIONS:   1. Left ventricular systolic function is normal with a 60-65% estimated ejection fraction.  Stress Test (January/2023):   IMPRESSION:  1. Normal stress myocardial perfusion imaging in response to  pharmacologic stress.  2. Well-maintained left ventricular function.  ECGs (reviewed and my interpretation):   4/5/2024 sinus rhythm with rate of 72 bpm, ID 130ms, QRS 82ms    Lab Results   Component Value Date    WBC 7.3 02/22/2024    HGB 13.8 02/22/2024    HCT 41.3 02/22/2024     02/22/2024    ALT 37 02/22/2024    AST 23 02/22/2024     02/22/2024    K 4.2 02/22/2024     02/22/2024    CREATININE 0.86 02/22/2024    BUN 14 02/22/2024    CO2 25 02/22/2024    TSH 1.78 02/22/2024    INR 1.6 (H) 01/28/2023       Assessment  Atrial fibrillation: Patient is maintaining sinus rhythm on flecainide. ECG today sinus rhythm with rate of 72 bpm, ID 130ms, QRS 82ms. We reviewed the importance of taking medications daily to control AF.   Hypertension: elevated today. Patient had not taken medications today. Encouraged patient to take medications daily and check BP at home.    Plan  Continue flecainide 100mg twice  daily  Follow up in 1 year      Exclusive of any other services or procedures performed, Anastasia WEISS, CLAUDETTE-CNP , spent 40 minutes in duration for this visit today.  This time consisted of chart review, obtaining history, and/or performing the exam as documented above as well as documenting the clinical information for the encounter in the electronic record, discussing treatment options, plans, and/or goals with patient, family, and/or caregiver, refilling medications, updating the electronic record, ordering medicines, lab work, imaging, referrals, and/or procedures as documented above and communicating with other [facility] professionals. I have discussed the results of laboratory, radiology, and cardiology studies with the patient and their family/caregiver.

## 2024-04-03 ENCOUNTER — TELEPHONE (OUTPATIENT)
Dept: PRIMARY CARE | Facility: CLINIC | Age: 49
End: 2024-04-03
Payer: MEDICAID

## 2024-04-03 NOTE — TELEPHONE ENCOUNTER
Ok to schedule an acute visit. Have her bring paperwork with her and we will fill out at time of visit. Thank you!

## 2024-04-03 NOTE — TELEPHONE ENCOUNTER
She needs to be seen again for extraction of her teeth. (She was told they did not receive previous paperwork in time.)

## 2024-04-05 ENCOUNTER — OFFICE VISIT (OUTPATIENT)
Dept: CARDIOLOGY | Facility: CLINIC | Age: 49
End: 2024-04-05
Payer: MEDICAID

## 2024-04-05 VITALS
HEART RATE: 72 BPM | SYSTOLIC BLOOD PRESSURE: 140 MMHG | BODY MASS INDEX: 49.03 KG/M2 | HEIGHT: 64 IN | WEIGHT: 287.2 LBS | OXYGEN SATURATION: 96 % | DIASTOLIC BLOOD PRESSURE: 100 MMHG

## 2024-04-05 DIAGNOSIS — I48.0 PAROXYSMAL ATRIAL FIBRILLATION (MULTI): Primary | ICD-10-CM

## 2024-04-05 DIAGNOSIS — I47.19 ATRIAL TACHYCARDIA (CMS-HCC): ICD-10-CM

## 2024-04-05 PROBLEM — Z20.822 CONTACT WITH AND (SUSPECTED) EXPOSURE TO COVID-19: Status: ACTIVE | Noted: 2023-01-31

## 2024-04-05 PROBLEM — K59.00 CONSTIPATION: Status: ACTIVE | Noted: 2024-04-05

## 2024-04-05 PROBLEM — E53.8 COBALAMIN DEFICIENCY: Status: ACTIVE | Noted: 2022-11-15

## 2024-04-05 PROBLEM — R06.02 SHORTNESS OF BREATH: Status: ACTIVE | Noted: 2023-05-17

## 2024-04-05 PROBLEM — I82.409 DEEP VEIN THROMBOSIS (DVT) (MULTI): Status: ACTIVE | Noted: 2023-01-31

## 2024-04-05 PROBLEM — S39.011A STRAIN OF ABDOMINAL MUSCLE: Status: ACTIVE | Noted: 2024-04-05

## 2024-04-05 PROBLEM — R07.89 ATYPICAL CHEST PAIN: Status: ACTIVE | Noted: 2018-01-23

## 2024-04-05 PROBLEM — M54.50 LOW BACK PAIN, UNSPECIFIED: Status: ACTIVE | Noted: 2022-11-18

## 2024-04-05 PROBLEM — S20.219A CONTUSION OF RIB: Status: ACTIVE | Noted: 2023-08-05

## 2024-04-05 PROBLEM — J41.1 PURULENT BRONCHITIS (MULTI): Status: ACTIVE | Noted: 2024-04-05
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
## 2024-04-05 PROBLEM — E34.9 DISORDER OF ENDOCRINE SYSTEM: Status: ACTIVE | Noted: 2023-05-17

## 2024-04-05 PROCEDURE — 93000 ELECTROCARDIOGRAM COMPLETE: CPT | Performed by: INTERNAL MEDICINE

## 2024-04-05 PROCEDURE — 3080F DIAST BP >= 90 MM HG: CPT | Performed by: NURSE PRACTITIONER

## 2024-04-05 PROCEDURE — 99214 OFFICE O/P EST MOD 30 MIN: CPT | Performed by: NURSE PRACTITIONER

## 2024-04-05 PROCEDURE — 3077F SYST BP >= 140 MM HG: CPT | Performed by: NURSE PRACTITIONER

## 2024-04-05 RX ORDER — OXYCODONE AND ACETAMINOPHEN 5; 325 MG/1; MG/1
TABLET ORAL EVERY 6 HOURS PRN
COMMUNITY
Start: 2020-12-15 | End: 2024-04-05 | Stop reason: WASHOUT

## 2024-04-05 RX ORDER — FLECAINIDE ACETATE 100 MG/1
100 TABLET ORAL 2 TIMES DAILY
Qty: 60 TABLET | Refills: 10 | OUTPATIENT
Start: 2024-04-05

## 2024-04-05 RX ORDER — LISINOPRIL AND HYDROCHLOROTHIAZIDE 10; 12.5 MG/1; MG/1
TABLET ORAL
COMMUNITY
Start: 2021-08-06 | End: 2024-04-05 | Stop reason: WASHOUT

## 2024-04-05 RX ORDER — CETIRIZINE HYDROCHLORIDE 10 MG/1
TABLET ORAL
COMMUNITY
Start: 2021-12-28 | End: 2024-04-05 | Stop reason: WASHOUT

## 2024-04-05 RX ORDER — FLECAINIDE ACETATE 100 MG/1
100 TABLET ORAL 2 TIMES DAILY
Qty: 60 TABLET | Refills: 5 | Status: SHIPPED | OUTPATIENT
Start: 2024-04-05 | End: 2024-10-02

## 2024-04-16 ENCOUNTER — OFFICE VISIT (OUTPATIENT)
Dept: PRIMARY CARE | Facility: CLINIC | Age: 49
End: 2024-04-16
Payer: MEDICAID

## 2024-04-16 VITALS
SYSTOLIC BLOOD PRESSURE: 120 MMHG | BODY MASS INDEX: 48.65 KG/M2 | HEIGHT: 64 IN | WEIGHT: 285 LBS | HEART RATE: 73 BPM | DIASTOLIC BLOOD PRESSURE: 70 MMHG

## 2024-04-16 DIAGNOSIS — E66.01 CLASS 3 SEVERE OBESITY WITH SERIOUS COMORBIDITY AND BODY MASS INDEX (BMI) OF 45.0 TO 49.9 IN ADULT, UNSPECIFIED OBESITY TYPE (MULTI): ICD-10-CM

## 2024-04-16 DIAGNOSIS — I10 BENIGN ESSENTIAL HYPERTENSION: ICD-10-CM

## 2024-04-16 DIAGNOSIS — E78.00 ELEVATED LDL CHOLESTEROL LEVEL: ICD-10-CM

## 2024-04-16 DIAGNOSIS — E53.8 VITAMIN B12 DEFICIENCY: Primary | ICD-10-CM

## 2024-04-16 DIAGNOSIS — E55.9 VITAMIN D DEFICIENCY: ICD-10-CM

## 2024-04-16 DIAGNOSIS — Z01.818 PREOPERATIVE CLEARANCE: ICD-10-CM

## 2024-04-16 PROBLEM — J41.1 PURULENT BRONCHITIS (MULTI): Status: RESOLVED | Noted: 2024-04-05 | Resolved: 2024-04-16

## 2024-04-16 PROCEDURE — 3078F DIAST BP <80 MM HG: CPT | Performed by: CLINICAL NURSE SPECIALIST

## 2024-04-16 PROCEDURE — 99214 OFFICE O/P EST MOD 30 MIN: CPT | Performed by: CLINICAL NURSE SPECIALIST

## 2024-04-16 PROCEDURE — 3008F BODY MASS INDEX DOCD: CPT | Performed by: CLINICAL NURSE SPECIALIST

## 2024-04-16 PROCEDURE — 3074F SYST BP LT 130 MM HG: CPT | Performed by: CLINICAL NURSE SPECIALIST

## 2024-04-16 RX ORDER — PHENTERMINE HYDROCHLORIDE 15 MG/1
15 CAPSULE ORAL
Qty: 30 CAPSULE | Refills: 0 | Status: SHIPPED | OUTPATIENT
Start: 2024-04-16 | End: 2024-05-21 | Stop reason: SDUPTHER

## 2024-04-16 ASSESSMENT — PATIENT HEALTH QUESTIONNAIRE - PHQ9
1. LITTLE INTEREST OR PLEASURE IN DOING THINGS: NOT AT ALL
2. FEELING DOWN, DEPRESSED OR HOPELESS: NOT AT ALL
SUM OF ALL RESPONSES TO PHQ9 QUESTIONS 1 AND 2: 0

## 2024-04-16 ASSESSMENT — ENCOUNTER SYMPTOMS
BACK PAIN: 0
WHEEZING: 0
PALPITATIONS: 0
DYSURIA: 0
JOINT SWELLING: 0
CHEST TIGHTNESS: 0
COUGH: 0
MYALGIAS: 0
FATIGUE: 0
BRUISES/BLEEDS EASILY: 0
DIZZINESS: 0
FLANK PAIN: 0
EYE PAIN: 0
HEMATURIA: 0
BLOOD IN STOOL: 0
PHOTOPHOBIA: 0
SEIZURES: 0
WOUND: 0
SLEEP DISTURBANCE: 0
OCCASIONAL FEELINGS OF UNSTEADINESS: 0
SORE THROAT: 0
DIARRHEA: 0
CHILLS: 0
LOSS OF SENSATION IN FEET: 0
CONSTIPATION: 0
VOMITING: 0
ACTIVITY CHANGE: 0
TROUBLE SWALLOWING: 0
FEVER: 0
APPETITE CHANGE: 0
SHORTNESS OF BREATH: 0
HEADACHES: 0
CONFUSION: 0
POLYDIPSIA: 0
ABDOMINAL PAIN: 0
NAUSEA: 0
DEPRESSION: 0
NECK PAIN: 0
UNEXPECTED WEIGHT CHANGE: 0
ARTHRALGIAS: 0

## 2024-04-16 ASSESSMENT — COLUMBIA-SUICIDE SEVERITY RATING SCALE - C-SSRS
2. HAVE YOU ACTUALLY HAD ANY THOUGHTS OF KILLING YOURSELF?: NO
1. IN THE PAST MONTH, HAVE YOU WISHED YOU WERE DEAD OR WISHED YOU COULD GO TO SLEEP AND NOT WAKE UP?: NO
6. HAVE YOU EVER DONE ANYTHING, STARTED TO DO ANYTHING, OR PREPARED TO DO ANYTHING TO END YOUR LIFE?: NO

## 2024-04-16 NOTE — PROGRESS NOTES
Subjective   Patient ID: Kerry Garcia is a 48 y.o. female who presents for Pre-op Exam (Pre op dental ).  HPI    OARRS:  Jacqueline Mullins, APRN-CNS on 4/16/2024  2:06 PM  I have personally reviewed the OARRS report for Kerry Garcia. I have considered the risks of abuse, dependence, addiction and diversion and I believe that it is clinically appropriate for Kerry Garcia to be prescribed this medication    Is the patient prescribed a combination of a benzodiazepine and opioid?  No    Last Urine Drug Screen / ordered today: Yes  No results found for this or any previous visit (from the past 8760 hour(s)).  N/A    Controlled Substance Agreement:  Date of the Last Agreement: 04/16/2024  Reviewed Controlled Substance Agreement including but not limited to the benefits, risks, and alternatives to treatment with a Controlled Substance medication(s).    Anorexiants:   What is the patient's goal of therapy? Weight Loss  Is this being achieved with current treatment? Previously effective.     I have assessed the patient's continuing efforts to lose weight., I have assessed the patient's dedication to the treatment program and the response to treatment., and I have assessed the presence or absence of contraindications, adverse effects, and indicators of possible substance abuse that would necessitate cessation of treatment utilizing controlled substance.    Activities of Daily Living:   Is your overall impression that this patient is benefiting (symptom reduction outweighs side effects) from anorexiants therapy? Yes     1. Physical Functioning: Same  2. Family Relationship: Same  3. Social Relationship: Same  4. Mood: Same  5. Sleep Patterns: Same  6. Overall Function: Same      Patient will be having Teeth extraction. Here today for Preoperative Clearance. Unsure the date that she will be having done. Lab work completed.     Here today as a follow up for her Hypertension. Has been consistent with her Losartan/HCTZ. Continued  on Eliquis and Flecainide. Recent follow up with Cardiology, stable.      Still working on weight loss. No longer going to [facility]. Has been trying to work on her diet. Chronic issues with struggling to lose weight. Stopped eating fast food. Followed with Bariatrics, not sure she is going to follow back with her. Has continued her Metformin. States that she has updated her home gym and trying to be more active. Weight has been stable but she is not having any success in weight loss. Previously tolerated and responded to Phentermine. Requesting to restart medication.      She has chronic issues with Peripheral Edema, previously was on Lasix but states that it made her urinate too much. Did not feel that it was beneficial for her. Denies any complaints of chest pain or shortness of breath. Lymphedema, followed with Rehab and Vascular.      Mother previously diagnosed in her 40's with Colon Cancer, Father with Polyps removed. Patient declined having a Colonoscopy done. Cologuard negative.      Continue her Wellbutrin.      Back Pain, no improvement with OTC medications. No imaging has been completed.     Constipation, no improvement with OTC medications, Miralax or Dulcolax.    Review of Systems   Constitutional:  Negative for activity change, appetite change, chills, fatigue, fever and unexpected weight change.   HENT:  Negative for ear pain, hearing loss, nosebleeds, sore throat, tinnitus and trouble swallowing.    Eyes:  Negative for photophobia, pain and visual disturbance.   Respiratory:  Negative for cough, chest tightness, shortness of breath and wheezing.    Cardiovascular:  Negative for chest pain, palpitations and leg swelling.   Gastrointestinal:  Negative for abdominal pain, blood in stool, constipation, diarrhea, nausea and vomiting.   Endocrine: Negative for cold intolerance, heat intolerance, polydipsia and polyuria.   Genitourinary:  Negative for dysuria, flank pain and hematuria.    Musculoskeletal:  Negative for arthralgias, back pain, joint swelling, myalgias and neck pain.   Skin:  Negative for pallor, rash and wound.   Allergic/Immunologic: Negative for immunocompromised state.   Neurological:  Negative for dizziness, seizures and headaches.   Hematological:  Does not bruise/bleed easily.   Psychiatric/Behavioral:  Negative for confusion and sleep disturbance.        Objective   Physical Exam  Constitutional:       General: She is not in acute distress.     Appearance: Normal appearance.   HENT:      Head: Normocephalic.      Nose: Nose normal.   Eyes:      Conjunctiva/sclera: Conjunctivae normal.   Neck:      Vascular: No carotid bruit.   Cardiovascular:      Rate and Rhythm: Normal rate and regular rhythm.      Pulses: Normal pulses.      Heart sounds: Normal heart sounds.   Pulmonary:      Effort: Pulmonary effort is normal.      Breath sounds: Normal breath sounds.   Abdominal:      General: Bowel sounds are normal.      Palpations: Abdomen is soft.   Musculoskeletal:         General: Normal range of motion.      Cervical back: Normal range of motion.   Skin:     General: Skin is warm and dry.   Neurological:      Mental Status: She is alert and oriented to person, place, and time. Mental status is at baseline.   Psychiatric:         Mood and Affect: Mood normal.         Behavior: Behavior normal.       Assessment/Plan         Reviewed results of blood work completed with patient.      #1. Lymphedema: Continue to follow with Specialist as previously determined.   #2. Obesity & #3. Weight Gain: BMI: 48.58. Followed with Bariatrics. Restart Phentermine, previously tolerated well. I have personally reviewed the OARRS report.  This report is scanned into the electronic medical record.  I have considered the risks of abuse, dependence, addiction and diversion.  I believe that it is clinically appropriate to prescribe this medication. Drug Screen: April 2024. Contract: April 2024.   #4.  Tobacco Use: Patient advised to quit smoking tobacco. The risks of smoking were reviewed with the patient and she was offered medication and/or smoking cessation counseling to help. Declined at this time. States that she only smokes when she drinks. Vaping.   #5. Vitamin B12 Deficiency: Vitamin B12 OTC.   #6. Hypertension, pAfib: Blood pressure controlled at [facility] today. Losartan/hydrochlorothiazide and Metoprolol. Encouraged ambulatory blood pressure monitoring. DASH diet. Followed with Cardiology. Continue Eliquis and Felcainide.   #7. DVT, Pulmonary Embolism: Continue Eliquis as prescribed.   #8. Back Pain: OTC pain relievers. Flexeril PRN.   #9. Insulin Resistance: Continue Metformin. Reevaluate with next lab work.   #10. Anxiety: Continue Wellbutrin. Follow up with medication effectiveness.   #11. Elevated LDL: Lifestyle changes. Continue to monitor.   #12. Vitamin D Deficiency: Reevaluate with next lab work.   #13. Constipation: Medication as prescribed.   #14. Preoperative Clearance: Form completed. Will be sent to Surgeon.      Last Mammogram: None Available. New order provided at [facility] today.   Unable to update Immunization due to Insurance.   Cologuard: August 2023, negative.     Jacqueline Mullins, APRN-CNS 04/16/24 1:56 PM Patient was identified as a fall risk. Risk prevention instructions provided.

## 2024-04-16 NOTE — PATIENT INSTRUCTIONS

## 2024-04-26 LAB — SCAN RESULT: NORMAL

## 2024-05-13 ENCOUNTER — TELEPHONE (OUTPATIENT)
Dept: CARDIOLOGY | Facility: CLINIC | Age: 49
End: 2024-05-13
Payer: MEDICAID

## 2024-05-13 NOTE — TELEPHONE ENCOUNTER
5/13 5:10 pm - Called patient to schedule NPV with Dr. Weber for general cardiac clearance for afib under [place]. [facility] requested clearance before they will proceed with dental procedure. Appointment with Dr. Weber booked for 5/16 10:30 am [place]. Patient agreeable to plan. - Sukhwinder ALTAMIRANO

## 2024-05-16 ENCOUNTER — OFFICE VISIT (OUTPATIENT)
Dept: CARDIOLOGY | Facility: CLINIC | Age: 49
End: 2024-05-16
Payer: MEDICAID

## 2024-05-16 VITALS
SYSTOLIC BLOOD PRESSURE: 132 MMHG | HEART RATE: 65 BPM | DIASTOLIC BLOOD PRESSURE: 102 MMHG | HEIGHT: 64 IN | OXYGEN SATURATION: 97 % | WEIGHT: 289.2 LBS | BODY MASS INDEX: 49.37 KG/M2

## 2024-05-16 DIAGNOSIS — Z01.818 PREOPERATIVE CLEARANCE: Primary | ICD-10-CM

## 2024-05-16 DIAGNOSIS — E78.2 MIXED HYPERLIPIDEMIA: ICD-10-CM

## 2024-05-16 DIAGNOSIS — I10 BENIGN ESSENTIAL HYPERTENSION: ICD-10-CM

## 2024-05-16 DIAGNOSIS — I48.0 PAROXYSMAL ATRIAL FIBRILLATION (MULTI): ICD-10-CM

## 2024-05-16 PROCEDURE — 3075F SYST BP GE 130 - 139MM HG: CPT | Performed by: STUDENT IN AN ORGANIZED HEALTH CARE EDUCATION/TRAINING PROGRAM

## 2024-05-16 PROCEDURE — 3008F BODY MASS INDEX DOCD: CPT | Performed by: STUDENT IN AN ORGANIZED HEALTH CARE EDUCATION/TRAINING PROGRAM

## 2024-05-16 PROCEDURE — 93000 ELECTROCARDIOGRAM COMPLETE: CPT | Performed by: STUDENT IN AN ORGANIZED HEALTH CARE EDUCATION/TRAINING PROGRAM

## 2024-05-16 PROCEDURE — 99215 OFFICE O/P EST HI 40 MIN: CPT | Performed by: STUDENT IN AN ORGANIZED HEALTH CARE EDUCATION/TRAINING PROGRAM

## 2024-05-16 PROCEDURE — 3080F DIAST BP >= 90 MM HG: CPT | Performed by: STUDENT IN AN ORGANIZED HEALTH CARE EDUCATION/TRAINING PROGRAM

## 2024-05-16 NOTE — PROGRESS NOTES
[facility] Cardiology Clinic Note    Date: 05/16/24  Time: 11:26 AM    Subjective   Kerry Garcia is a 48 y.o. female with PMHX of prediabets, HTN, HLD, Afibb, who is coming to cardiology clinic to establish care.  Patient has history of A-fib in the past but has been in sinus rhythm for quite some time.  Patient is going for dental procedure and requires teeth removal.  Patient had requested to receive general anesthesia.  Given history of A-fib she was requested to obtain cardiac restratification.  Patient has no known history of CAD.  Patient does not have history of MI.  Patient has no chest pain.  Patient has no shortness of breath.  Patient has no history of heart failure.  Patient has prior echo and stress test both of which are unremarkable.  Patient denies any current symptoms of chest pain, dyspnea, PND, orthopnea.  Patient has been in sinus rhythm on multiple prior EKGs.  Patient is compliant with medical therapy.    Prior CT scan did not show any coronary calcification.  Although the study was not optimized to look at coronary vessels.    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  Able to function and perform daily ADLs without limitation.  Current functional capacity is at least greater than 4 METS.      Review of Systems:  Otherwise, limited cardiovascular review of systems is negative.        Medical History:   She has a past medical history of Amenorrhea (05/17/2023), Body mass index (BMI)40.0-44.9, adult (10/19/2020), Cervicalgia (05/31/2022), Chronic cough (05/09/2016), Cough (05/17/2023), Encounter for follow-up examination after completed treatment for conditions other than malignant neoplasm (03/18/2021), Localized edema (10/29/2020), Other conditions influencing health status (04/12/2022), Other conditions influencing health status (12/28/2021), Pelvic pain (05/17/2023), Personal history of other diseases of the nervous system and sense organs, Personal history of other diseases of the  respiratory system (2016), Personal history of other endocrine, nutritional and metabolic disease (2021), Personal history of other endocrine, nutritional and metabolic disease (2022), Personal history of other mental and behavioral disorders, Personal history of other specified conditions, and Polyphagia (2023).  Surgical History:   Past Surgical History:   Procedure Laterality Date     SECTION, CLASSIC  2013     Section    CHOLECYSTECTOMY  2013    Cholecystectomy Laparoscopic    OTHER SURGICAL HISTORY  2022    Ankle surgery   PSHP@  Social History:   Social Determinants of Health with Concerns     Tobacco Use: High Risk (2024)    Patient History     Smoking Tobacco Use: Some Days     Smokeless Tobacco Use: Never     Passive Exposure: Not on file   Alcohol Use: Not on file   Financial Resource Strain: Not on file   Food Insecurity: Not on file   Transportation Needs: Not on file   Physical Activity: Not on file   Stress: Not on file   Social Connections: Not on file   Intimate Partner Violence: Not on file   Housing Stability: Not on file   Utilities: Not on file   Digital Equity: Not on file   Health Literacy: Not on file     Family History:   Family History   Problem Relation Name Age of Onset    Diabetes Mother Kitty white     Cancer Mother Kitty salgado     Anxiety disorder Mother Kitty salgado     Other (vertigo) Father      Diabetes Father      Neuropathy Father      Other (pad) Father      Other (cardiac disease) Paternal Grandmother        Allergies:  Hydrocodone-acetaminophen    Outpatient Medications:  Current Outpatient Medications   Medication Instructions    AMOXICILLIN ORAL oral, As needed    buPROPion XL (Wellbutrin XL) 300 mg 24 hr tablet TAKE 1 TABLET BY MOUTH EVERY DAY IN THE MORNING *DO NOT CRUSH, CHEW OR SPLIT*    Eliquis 5 mg, oral, 2 times daily    flecainide (TAMBOCOR) 100 mg, oral, 2 times daily    gabapentin (NEURONTIN) 300 mg, oral     "losartan-hydrochlorothiazide (Hyzaar) 50-12.5 mg tablet 1 tablet, oral, Daily    metFORMIN (Glucophage) 500 mg tablet TAKE ONE (1) TABLET BY MOUTH TWICE DAILY WITH MEALS    metoprolol tartrate (LOPRESSOR) 25 mg, oral, 2 times daily    phentermine 15 mg, oral, Daily before breakfast       Objective     Physical Exam  Vitals:    05/16/24 1042   BP: (!) 132/102   BP Location: Left arm   Patient Position: Sitting   BP Cuff Size: Adult   Pulse: 65   SpO2: 97%   Weight: 131 kg (289 lb 3.2 oz)   Height: 1.626 m (5' 4\")     Wt Readings from Last 3 Encounters:   05/16/24 131 kg (289 lb 3.2 oz)   04/16/24 129 kg (285 lb)   04/05/24 130 kg (287 lb 3.2 oz)       General: Alert and Oriented, No distress, cooperative  Head: Normocephalic without obvious abnormality, atraumatic  Eyes: Conjunctiva/corneas clear, EOM's grossly intact  Neck: Supple, trachea midline, No thyroid enlargement/tenderness/nodules; No JVD  Lungs: Clear to auscultation bilaterally, no wheezes, rhonci, or rales. respirations unlabored  Chest Wall: No tenderness or deformity  Heart: Regular rhythm, normal S1/S2, no murmur  Abdomen: Soft, non-tender, Non-distended, bowel sounds active  Extremities: No edema, no cyanosis, no clubbing  Skin: Skin color, texture, turgor normal.  No rashes or lesions noted  Neurologic: Alert and oriented x 3, grossly moving all extremities, speech intact        I have personally reviewed the following images and laboratory findings:  ECG: NSR, ST/T waves abnormalities in anterior leads, consider ischemia -ST/T changes similar to past EKG  Echocardiogram:   01/2023  PHYSICIAN INTERPRETATION:  Left Ventricle: Left ventricular systolic function is normal, with an estimated ejection fraction of 60-65%. There are no regional wall motion abnormalities. The left ventricular cavity size is normal. Spectral Doppler shows a normal pattern of left ventricular diastolic filling.  Left Atrium: The left atrium is normal in size.  Right " Ventricle: The right ventricle is normal in size. There is normal right ventricular global systolic function.  Right Atrium: The right atrium is normal in size.  Aortic Valve: The aortic valve was not well visualized. There is no evidence of aortic valve regurgitation. The peak instantaneous gradient of the aortic valve is 6.1 mmHg. The mean gradient of the aortic valve is 3.0 mmHg.  Mitral Valve: The mitral valve is normal in structure. There is no evidence of mitral valve regurgitation.  Tricuspid Valve: The tricuspid valve is structurally normal. No evidence of tricuspid regurgitation.  Pulmonic Valve: The pulmonic valve is not well visualized. There is physiologic pulmonic valve regurgitation.  Pericardium: There is no pericardial effusion noted.  Aorta: The aortic root is normal.        CONCLUSIONS:   1. Left ventricular systolic function is normal with a 60-65% estimated ejection fraction.  STUDY:  CARDIAC STRESS/REST INJECTION;  1/30/2023 2:38 pm     INDICATION:  SOBOE .     COMPARISON:  None.     ACCESSION NUMBER(S):  [id number]     ORDERING CLINICIAN:  LAVELLE LI     TECHNIQUE:  DIVISION OF NUCLEAR MEDICINE  PHARMACOLOGIC STRESS MYOCARDIAL PERFUSION SCAN, ONE DAY PROTOCOL     The patient received an intravenous dose of  11.6 mCi of Tc-99m  tetrofosmin and resting emission tomographic (SPECT) images of the  myocardium were acquired. The patient then received an intravenous  infusion of 0.4mg regadenoson (Lexiscan) followed by an additional  dose of  34.6 mCi of Tc-99m tetrofosmin. Stress phase SPECT images of  the myocardium were then acquired. These included ECG-gated images to  assess and quantify ventricular function.     FINDINGS:  Stress and rest images both demonstrate a normal distribution of  perfusion throughout all LV segments with no sign of ischemia.     ECG-gated images demonstrate normal LV size and myocardial  contractility with an LV ejection fraction of   56 % (normal above 50  percent).      IMPRESSION:  1. Normal stress myocardial perfusion imaging in response to  pharmacologic stress.  2. Well-maintained left ventricular function.     Laboratory values:   Office Visit on 04/16/2024   Component Date Value    Scan Result 04/16/2024 See Scanned Result      CBC -  Lab Results   Component Value Date    WBC 7.3 02/22/2024    HGB 13.8 02/22/2024    HCT 41.3 02/22/2024    MCV 93 02/22/2024     02/22/2024       CMP -  Lab Results   Component Value Date    CALCIUM 9.2 02/22/2024    PROT 7.1 02/22/2024    ALBUMIN 4.2 02/22/2024    AST 23 02/22/2024    ALT 37 02/22/2024    ALKPHOS 81 02/22/2024    BILITOT 0.7 02/22/2024       LIPID PANEL -   Lab Results   Component Value Date    CHOL 232 (H) 02/22/2024    HDL 42.2 02/22/2024    CHHDL 5.5 02/22/2024    VLDL 38 02/22/2024    TRIG 188 (H) 02/22/2024    NHDL 190 (H) 02/22/2024       RENAL FUNCTION PANEL -   Lab Results   Component Value Date    K 4.2 02/22/2024       Lab Results   Component Value Date     (H) 05/31/2022    HGBA1C 5.3 02/22/2024        Assessment/Plan   Paroxysmal Afibb on anticoagulation  Essential hypertension  Hyperlipidemia  Preoperative risk stratification    Plan:  -Based on RCRI risk related patient is class I Risk.functional capacity is greater than 4 METS.  Patient has low risk of perioperative MACE.  Patient can proceed with the planned dental procedure without need for additional cardiac workup.  Anticoagulation can be stopped in the perioperative time at the discretion of dental team.  Resume anticoagulation once safe from their standpoint.  -Continue medical therapy for hypertension as previously taking.  -Consider statin therapy for hyperlipidemia.  Will defer to PCP.    In addition, the following orders were placed today:  Orders Placed This Encounter   Procedures    ECG 12 Lead                 SIGNATURE: Mahad Weber MD PATIENT NAME: Kerry Garcia   DATE/TIME: May 16, 2024 11:26 AM MRN: [id number]

## 2024-05-20 ENCOUNTER — TELEPHONE (OUTPATIENT)
Dept: PRIMARY CARE | Facility: CLINIC | Age: 49
End: 2024-05-20
Payer: MEDICAID

## 2024-05-20 NOTE — TELEPHONE ENCOUNTER
Patient called to see if tomorrows appointment can be converted to a physical. So she can coordinate care with cardiologist, had a deadline for physical requirement please advise

## 2024-05-21 ENCOUNTER — OFFICE VISIT (OUTPATIENT)
Dept: PRIMARY CARE | Facility: CLINIC | Age: 49
End: 2024-05-21
Payer: MEDICAID

## 2024-05-21 VITALS
DIASTOLIC BLOOD PRESSURE: 80 MMHG | SYSTOLIC BLOOD PRESSURE: 110 MMHG | BODY MASS INDEX: 49.68 KG/M2 | WEIGHT: 291 LBS | HEIGHT: 64 IN | HEART RATE: 78 BPM

## 2024-05-21 DIAGNOSIS — I10 BENIGN ESSENTIAL HYPERTENSION: ICD-10-CM

## 2024-05-21 DIAGNOSIS — E55.9 VITAMIN D DEFICIENCY: ICD-10-CM

## 2024-05-21 DIAGNOSIS — E66.01 CLASS 3 SEVERE OBESITY WITH SERIOUS COMORBIDITY AND BODY MASS INDEX (BMI) OF 45.0 TO 49.9 IN ADULT, UNSPECIFIED OBESITY TYPE (MULTI): ICD-10-CM

## 2024-05-21 DIAGNOSIS — E53.8 VITAMIN B12 DEFICIENCY: ICD-10-CM

## 2024-05-21 DIAGNOSIS — Z00.00 HEALTHCARE MAINTENANCE: Primary | ICD-10-CM

## 2024-05-21 DIAGNOSIS — E78.00 ELEVATED LDL CHOLESTEROL LEVEL: ICD-10-CM

## 2024-05-21 PROBLEM — J41.1 PURULENT BRONCHITIS (MULTI): Status: RESOLVED | Noted: 2024-04-05 | Resolved: 2024-05-21

## 2024-05-21 PROCEDURE — 3008F BODY MASS INDEX DOCD: CPT | Performed by: CLINICAL NURSE SPECIALIST

## 2024-05-21 PROCEDURE — 99396 PREV VISIT EST AGE 40-64: CPT | Performed by: CLINICAL NURSE SPECIALIST

## 2024-05-21 PROCEDURE — 3079F DIAST BP 80-89 MM HG: CPT | Performed by: CLINICAL NURSE SPECIALIST

## 2024-05-21 PROCEDURE — 3074F SYST BP LT 130 MM HG: CPT | Performed by: CLINICAL NURSE SPECIALIST

## 2024-05-21 RX ORDER — ATORVASTATIN CALCIUM 10 MG/1
10 TABLET, FILM COATED ORAL DAILY
Qty: 100 TABLET | Refills: 3 | Status: SHIPPED | OUTPATIENT
Start: 2024-05-21 | End: 2025-06-25

## 2024-05-21 RX ORDER — PHENTERMINE HYDROCHLORIDE 15 MG/1
15 CAPSULE ORAL
Qty: 30 CAPSULE | Refills: 0 | Status: SHIPPED | OUTPATIENT
Start: 2024-05-21 | End: 2024-06-20

## 2024-05-21 ASSESSMENT — ENCOUNTER SYMPTOMS
LOSS OF SENSATION IN FEET: 0
WHEEZING: 0
FEVER: 0
POLYDIPSIA: 0
CONSTIPATION: 0
FATIGUE: 0
BACK PAIN: 0
TROUBLE SWALLOWING: 0
BLOOD IN STOOL: 0
SLEEP DISTURBANCE: 0
ARTHRALGIAS: 0
EYE PAIN: 0
CHEST TIGHTNESS: 0
APPETITE CHANGE: 0
OCCASIONAL FEELINGS OF UNSTEADINESS: 0
DYSURIA: 0
HEADACHES: 0
DIZZINESS: 0
CONFUSION: 0
WOUND: 0
ACTIVITY CHANGE: 0
DIARRHEA: 0
FLANK PAIN: 0
NAUSEA: 0
JOINT SWELLING: 0
BRUISES/BLEEDS EASILY: 0
PALPITATIONS: 0
PHOTOPHOBIA: 0
UNEXPECTED WEIGHT CHANGE: 0
NECK PAIN: 0
HEMATURIA: 0
DEPRESSION: 0
MYALGIAS: 0
SORE THROAT: 0
SHORTNESS OF BREATH: 0
ABDOMINAL PAIN: 0
COUGH: 0
CHILLS: 0
VOMITING: 0
SEIZURES: 0

## 2024-05-21 ASSESSMENT — PATIENT HEALTH QUESTIONNAIRE - PHQ9
2. FEELING DOWN, DEPRESSED OR HOPELESS: NOT AT ALL
1. LITTLE INTEREST OR PLEASURE IN DOING THINGS: NOT AT ALL
SUM OF ALL RESPONSES TO PHQ9 QUESTIONS 1 AND 2: 0

## 2024-05-21 NOTE — PROGRESS NOTES
"Subjective   Patient ID: Kerry Garcia is a 48 y.o. female who presents for Annual Exam (Wellness exam /Ignacio cleared again for dental surgery ).  HPI    OARRS:  Jacqueline Mullins, APRN-CNS on 5/21/2024  1:35 PM  I have personally reviewed the OARRS report for Kerry Garcia. I have considered the risks of abuse, dependence, addiction and diversion and I believe that it is clinically appropriate for Kerry Garcia to be prescribed this medication    Is the patient prescribed a combination of a benzodiazepine and opioid?  No    Last Urine Drug Screen / ordered today: No  No results found for this or any previous visit (from the past 8760 hour(s)).  Results are as expected.       Controlled Substance Agreement:  Date of the Last Agreement: 04/16/2024  Reviewed Controlled Substance Agreement including but not limited to the benefits, risks, and alternatives to treatment with a Controlled Substance medication(s).    Anorexiants:   What is the patient's goal of therapy? Weight Loss  Is this being achieved with current treatment? Has been previously beneficial.     Patient has demonstrated continued efforts to lose weight, is dedicated to the treatment program and the response to treatment. and I have assessed for the presence or absence of contraindications, adverse effects, and indicators of possible substance abuse that would necessitate cessation of treatment utilizing controlled substance.    Activities of Daily Living:   Is your overall impression that this patient is benefiting (symptom reduction outweighs side effects) from anorexiants therapy? Yes     1. Physical Functioning: Same  2. Family Relationship: Same  3. Social Relationship: Same  4. Mood: Same  5. Sleep Patterns: Same  6. Overall Function: Same      Patient will be having Teeth extraction. Here today for Preoperative Clearance, states that she just needs a \"Physical.\" Unsure the date that she will be having done. Lab work completed. Recently followed with " Cardiology.      Here today as a follow up for her Hypertension. Has been consistent with her Losartan/HCTZ. Continued on Eliquis and Flecainide. Recent follow up with Cardiology, stable.      Still working on weight loss. No longer going to [employer]. Has been trying to work on her diet. Chronic issues with struggling to lose weight. Stopped eating fast food. Followed with Bariatrics, not sure she is going to follow back with her. Has continued her Metformin. States that she has updated her home gym and trying to be more active. Weight has been stable but she is not having any success in weight loss. Previously tolerated and responded to Phentermine. Requesting to continue medication.      She has chronic issues with Peripheral Edema, previously was on Lasix but states that it made her urinate too much. Did not feel that it was beneficial for her. Denies any complaints of chest pain or shortness of breath. Lymphedema, followed with Rehab and Vascular.      Mother previously diagnosed in her 40's with Colon Cancer, Father with Polyps removed. Patient declined having a Colonoscopy done. Cologuard negative.      Continue her Wellbutrin.      Back Pain, no improvement with OTC medications. No imaging has been completed.     Constipation, no improvement with OTC medications, Miralax or Dulcolax.    Review of Systems   Constitutional:  Negative for activity change, appetite change, chills, fatigue, fever and unexpected weight change.   HENT:  Negative for ear pain, hearing loss, nosebleeds, sore throat, tinnitus and trouble swallowing.    Eyes:  Negative for photophobia, pain and visual disturbance.   Respiratory:  Negative for cough, chest tightness, shortness of breath and wheezing.    Cardiovascular:  Negative for chest pain, palpitations and leg swelling.   Gastrointestinal:  Negative for abdominal pain, blood in stool, constipation, diarrhea, nausea and vomiting.   Endocrine: Negative for cold intolerance, heat  intolerance, polydipsia and polyuria.   Genitourinary:  Negative for dysuria, flank pain and hematuria.   Musculoskeletal:  Negative for arthralgias, back pain, joint swelling, myalgias and neck pain.   Skin:  Negative for pallor, rash and wound.   Allergic/Immunologic: Negative for immunocompromised state.   Neurological:  Negative for dizziness, seizures and headaches.   Hematological:  Does not bruise/bleed easily.   Psychiatric/Behavioral:  Negative for confusion and sleep disturbance.        Objective   Physical Exam  Vitals and nursing note reviewed.   Constitutional:       General: She is not in acute distress.     Appearance: Normal appearance.   HENT:      Head: Normocephalic.      Nose: Nose normal.   Eyes:      Conjunctiva/sclera: Conjunctivae normal.   Neck:      Vascular: No carotid bruit.   Cardiovascular:      Rate and Rhythm: Normal rate and regular rhythm.      Pulses: Normal pulses.      Heart sounds: Normal heart sounds.   Pulmonary:      Effort: Pulmonary effort is normal.      Breath sounds: Normal breath sounds.   Abdominal:      General: Bowel sounds are normal.      Palpations: Abdomen is soft.   Musculoskeletal:         General: Normal range of motion.      Cervical back: Normal range of motion.   Skin:     General: Skin is warm and dry.   Neurological:      Mental Status: She is alert and oriented to person, place, and time. Mental status is at baseline.   Psychiatric:         Mood and Affect: Mood normal.         Behavior: Behavior normal.         Assessment/Plan          Reviewed results of blood work completed with patient.      #1. Lymphedema: Continue to follow with Specialist as previously determined.   #2. Obesity & #3. Weight Gain: BMI: 49.95. Followed with Bariatrics. Continue Phentermine, previously tolerated well. I have personally reviewed the OARRS report.  This report is scanned into the electronic medical record.  I have considered the risks of abuse, dependence, addiction  and diversion.  I believe that it is clinically appropriate to prescribe this medication. Drug Screen: April 2024. Contract: April 2024.   #4. Tobacco Use: Patient advised to quit smoking tobacco. The risks of smoking were reviewed with the patient and she was offered medication and/or smoking cessation counseling to help. Declined at this time. States that she only smokes when she drinks. Vaping.   #5. Vitamin B12 Deficiency: Vitamin B12 OTC.   #6. Hypertension, pAfib: Blood pressure controlled at OV today. Losartan/hydrochlorothiazide and Metoprolol. Encouraged ambulatory blood pressure monitoring. DASH diet. Followed with Cardiology. Continue Eliquis and Felcainide.   #7. DVT, Pulmonary Embolism: Continue Eliquis as prescribed.   #8. Back Pain: OTC pain relievers. Flexeril PRN.   #9. Insulin Resistance: Continue Metformin. Reevaluate with next lab work.   #10. Anxiety: Continue Wellbutrin. Follow up with medication effectiveness.   #11. Elevated LDL: Discussed Prescription therapy. Lifestyle changes. Continue to monitor.   #12. Vitamin D Deficiency: Reevaluate with next lab work.   #13. Constipation: Medication as prescribed.   #14. Wellness: Routine and age appropriate recommendations discussed with the patient today and patient verbalized understanding of the recommendations.  Questions answered.  Age appropriate immunizations and preventative screenings discussed with the patient and ordered as appropriate. Labs updated and ordered as indicated. Recommend healthy diet and daily exercise to maintain healthy body weight.      Last Mammogram: None Available. New order provided at last OV.  Unable to update Immunization due to Insurance.   Cologuard: August 2023, negative.   Wellness: May 2024.     Jacqueline Mullins, CLAUDETTE-CNS 05/21/24 1:32 PM    - - -

## 2024-06-04 ENCOUNTER — TELEPHONE (OUTPATIENT)
Dept: PRIMARY CARE | Facility: CLINIC | Age: 49
End: 2024-06-04
Payer: MEDICAID

## 2024-06-04 DIAGNOSIS — E66.01 CLASS 3 SEVERE OBESITY WITH SERIOUS COMORBIDITY AND BODY MASS INDEX (BMI) OF 45.0 TO 49.9 IN ADULT, UNSPECIFIED OBESITY TYPE (MULTI): ICD-10-CM

## 2024-06-04 RX ORDER — PHENTERMINE HYDROCHLORIDE 15 MG/1
15 CAPSULE ORAL
Qty: 30 CAPSULE | Refills: 0 | OUTPATIENT
Start: 2024-06-04 | End: 2024-07-04

## 2024-06-04 NOTE — TELEPHONE ENCOUNTER
Medication name: Phentermine     Strength: 15 mg capsule     Directions: Take 1 capsule (15 mg) by mouth once daily in the morning. Take before meals.     Pharmacy: [facility] [place]     Last Office Visit: 5/21/24    Next Office Visit: 6/25/24    Cancel Prescription sent to [facility] in [place]

## 2024-06-11 RX ORDER — AMOXICILLIN 500 MG/1
500 CAPSULE ORAL 3 TIMES DAILY
COMMUNITY

## 2024-06-11 RX ORDER — ATORVASTATIN CALCIUM 10 MG/1
10 TABLET, FILM COATED ORAL DAILY
COMMUNITY

## 2024-06-11 NOTE — PROGRESS NOTES
[facility] PRE-ADMISSION TESTING INSTRUCTIONS    The Preadmission Testing patient is instructed accordingly using the following criteria (check applicable):    ARRIVAL INSTRUCTIONS:  [x] Parking the day of Surgery is located in the Main Entrance lot.  Upon entering the door, make an immediate right to the surgery reception desk    [x] Bring photo ID and insurance card    [x] Bring in a copy of Living will or Durable Power of  papers.    [x] Please be sure to arrange for responsible adult to provide transportation to and from the hospital    [x] Please arrange for responsible adult to be with you for the 24 hour period post procedure due to having anesthesia    [x] If you awake am of surgery not feeling well or have temperature >100 please call [phone]    GENERAL INSTRUCTIONS:    [x] May have clear liquids until 4 hours prior to surgery. Examples include water, fruit juices (no pulp), jello, popsicles, black coffee or tea, beef or chicken broth.              No gum, candy or mints.    [x] You may brush your teeth, but do not swallow any water    [x] Take medications as instructed with 1-2 oz of water    [x] Stop herbal supplements and vitamins 5 days prior to procedure    [x] Follow preop dosing of blood thinners per physician instructions    [] Take 1/2 dose of evening insulin, but no insulin after midnight    [] No oral diabetic medications after midnight    [] If diabetic and have low blood sugar or feel symptomatic, take 1-2oz apple juice only    [] Bring inhalers day of surgery    [] Bring C-PAP/ Bi-Pap day of surgery    [] Bring urine specimen day of surgery    [x] Shower or bath with soap, lather and rinse well, AM of Surgery, no lotion, powders or creams to surgical site    [] Follow bowel prep as instructed per surgeon    [x] No tobacco products within 24 hours of surgery     [x] No alcohol or illegal drug use within 24 hours of surgery.    [x] Jewelry, body

## 2024-06-13 PROBLEM — K02.9 DENTAL CARIES: Status: ACTIVE | Noted: 2024-06-13

## 2024-06-14 ENCOUNTER — HOSPITAL ENCOUNTER (OUTPATIENT)
Age: 49
Setting detail: OUTPATIENT SURGERY
Discharge: HOME | End: 2024-06-14
Attending: DENTIST | Admitting: DENTIST
Payer: MEDICAID

## 2024-06-14 ENCOUNTER — ANESTHESIA (OUTPATIENT)
Dept: OPERATING ROOM | Age: 49
End: 2024-06-14
Payer: MEDICAID

## 2024-06-14 ENCOUNTER — ANESTHESIA EVENT (OUTPATIENT)
Dept: OPERATING ROOM | Age: 49
End: 2024-06-14
Payer: MEDICAID

## 2024-06-14 VITALS
SYSTOLIC BLOOD PRESSURE: 150 MMHG | HEART RATE: 67 BPM | HEIGHT: 64 IN | OXYGEN SATURATION: 97 % | WEIGHT: 283 LBS | TEMPERATURE: 98 F | DIASTOLIC BLOOD PRESSURE: 87 MMHG | RESPIRATION RATE: 20 BRPM | BODY MASS INDEX: 48.32 KG/M2

## 2024-06-14 DIAGNOSIS — K02.9 DENTAL CARIES: Primary | ICD-10-CM

## 2024-06-14 LAB
ANION GAP SERPL CALCULATED.3IONS-SCNC: 8 MMOL/L (ref 7–16)
BUN SERPL-MCNC: 13 MG/DL (ref 6–20)
CALCIUM SERPL-MCNC: 8.9 MG/DL (ref 8.6–10.2)
CHLORIDE SERPL-SCNC: 107 MMOL/L (ref 98–107)
CO2 SERPL-SCNC: 27 MMOL/L (ref 22–29)
CREAT SERPL-MCNC: 0.9 MG/DL (ref 0.5–1)
ERYTHROCYTE [DISTWIDTH] IN BLOOD BY AUTOMATED COUNT: 12.8 % (ref 11.5–15)
GFR, ESTIMATED: 83 ML/MIN/1.73M2
GLUCOSE SERPL-MCNC: 94 MG/DL (ref 74–99)
HCT VFR BLD AUTO: 39.7 % (ref 34–48)
HGB BLD-MCNC: 12.8 G/DL (ref 11.5–15.5)
MCH RBC QN AUTO: 31.4 PG (ref 26–35)
MCHC RBC AUTO-ENTMCNC: 32.2 G/DL (ref 32–34.5)
MCV RBC AUTO: 97.3 FL (ref 80–99.9)
PLATELET # BLD AUTO: 184 K/UL (ref 130–450)
PMV BLD AUTO: 9.3 FL (ref 7–12)
POTASSIUM SERPL-SCNC: 4.2 MMOL/L (ref 3.5–5)
RBC # BLD AUTO: 4.08 M/UL (ref 3.5–5.5)
SODIUM SERPL-SCNC: 142 MMOL/L (ref 132–146)
WBC OTHER # BLD: 4.5 K/UL (ref 4.5–11.5)

## 2024-06-14 PROCEDURE — 3600000002 HC SURGERY LEVEL 2 BASE: Performed by: DENTIST

## 2024-06-14 PROCEDURE — 36415 COLL VENOUS BLD VENIPUNCTURE: CPT

## 2024-06-14 PROCEDURE — 2500000003 HC RX 250 WO HCPCS: Performed by: DENTIST

## 2024-06-14 PROCEDURE — 3700000001 HC ADD 15 MINUTES (ANESTHESIA): Performed by: DENTIST

## 2024-06-14 PROCEDURE — 7100000010 HC PHASE II RECOVERY - FIRST 15 MIN: Performed by: DENTIST

## 2024-06-14 PROCEDURE — 7100000001 HC PACU RECOVERY - ADDTL 15 MIN: Performed by: DENTIST

## 2024-06-14 PROCEDURE — 7100000000 HC PACU RECOVERY - FIRST 15 MIN: Performed by: DENTIST

## 2024-06-14 PROCEDURE — 80048 BASIC METABOLIC PNL TOTAL CA: CPT

## 2024-06-14 PROCEDURE — 7100000011 HC PHASE II RECOVERY - ADDTL 15 MIN: Performed by: DENTIST

## 2024-06-14 PROCEDURE — 3600000012 HC SURGERY LEVEL 2 ADDTL 15MIN: Performed by: DENTIST

## 2024-06-14 PROCEDURE — 2580000003 HC RX 258

## 2024-06-14 PROCEDURE — 85027 COMPLETE CBC AUTOMATED: CPT

## 2024-06-14 PROCEDURE — 3700000000 HC ANESTHESIA ATTENDED CARE: Performed by: DENTIST

## 2024-06-14 PROCEDURE — 2500000003 HC RX 250 WO HCPCS

## 2024-06-14 PROCEDURE — 2709999900 HC NON-CHARGEABLE SUPPLY: Performed by: DENTIST

## 2024-06-14 PROCEDURE — 6360000002 HC RX W HCPCS: Performed by: ANESTHESIOLOGY

## 2024-06-14 PROCEDURE — 6360000002 HC RX W HCPCS

## 2024-06-14 RX ORDER — SODIUM CHLORIDE 9 MG/ML
INJECTION, SOLUTION INTRAVENOUS PRN
Status: DISCONTINUED | OUTPATIENT
Start: 2024-06-14 | End: 2024-06-14 | Stop reason: HOSPADM

## 2024-06-14 RX ORDER — ONDANSETRON 2 MG/ML
INJECTION INTRAMUSCULAR; INTRAVENOUS PRN
Status: DISCONTINUED | OUTPATIENT
Start: 2024-06-14 | End: 2024-06-14 | Stop reason: SDUPTHER

## 2024-06-14 RX ORDER — OXYCODONE HYDROCHLORIDE 5 MG/1
10 TABLET ORAL PRN
Status: DISCONTINUED | OUTPATIENT
Start: 2024-06-14 | End: 2024-06-14 | Stop reason: HOSPADM

## 2024-06-14 RX ORDER — LIDOCAINE HYDROCHLORIDE 20 MG/ML
INJECTION, SOLUTION EPIDURAL; INFILTRATION; INTRACAUDAL; PERINEURAL PRN
Status: DISCONTINUED | OUTPATIENT
Start: 2024-06-14 | End: 2024-06-14 | Stop reason: SDUPTHER

## 2024-06-14 RX ORDER — ROCURONIUM BROMIDE 10 MG/ML
INJECTION, SOLUTION INTRAVENOUS PRN
Status: DISCONTINUED | OUTPATIENT
Start: 2024-06-14 | End: 2024-06-14 | Stop reason: SDUPTHER

## 2024-06-14 RX ORDER — GLYCOPYRROLATE 0.2 MG/ML
INJECTION INTRAMUSCULAR; INTRAVENOUS PRN
Status: DISCONTINUED | OUTPATIENT
Start: 2024-06-14 | End: 2024-06-14 | Stop reason: SDUPTHER

## 2024-06-14 RX ORDER — DIPHENHYDRAMINE HYDROCHLORIDE 50 MG/ML
INJECTION INTRAMUSCULAR; INTRAVENOUS PRN
Status: DISCONTINUED | OUTPATIENT
Start: 2024-06-14 | End: 2024-06-14 | Stop reason: SDUPTHER

## 2024-06-14 RX ORDER — SODIUM CHLORIDE 0.9 % (FLUSH) 0.9 %
5-40 SYRINGE (ML) INJECTION PRN
Status: DISCONTINUED | OUTPATIENT
Start: 2024-06-14 | End: 2024-06-14 | Stop reason: HOSPADM

## 2024-06-14 RX ORDER — MIDAZOLAM HYDROCHLORIDE 1 MG/ML
INJECTION INTRAMUSCULAR; INTRAVENOUS PRN
Status: DISCONTINUED | OUTPATIENT
Start: 2024-06-14 | End: 2024-06-14 | Stop reason: SDUPTHER

## 2024-06-14 RX ORDER — KETAMINE HYDROCHLORIDE 10 MG/ML
INJECTION, SOLUTION INTRAMUSCULAR; INTRAVENOUS PRN
Status: DISCONTINUED | OUTPATIENT
Start: 2024-06-14 | End: 2024-06-14 | Stop reason: SDUPTHER

## 2024-06-14 RX ORDER — AMOXICILLIN 500 MG/1
500 CAPSULE ORAL 3 TIMES DAILY
Qty: 21 CAPSULE | Refills: 0 | Status: SHIPPED | OUTPATIENT
Start: 2024-06-14 | End: 2024-06-21

## 2024-06-14 RX ORDER — MEPERIDINE HYDROCHLORIDE 25 MG/ML
12.5 INJECTION INTRAMUSCULAR; INTRAVENOUS; SUBCUTANEOUS EVERY 5 MIN PRN
Status: DISCONTINUED | OUTPATIENT
Start: 2024-06-14 | End: 2024-06-14 | Stop reason: HOSPADM

## 2024-06-14 RX ORDER — OXYCODONE HYDROCHLORIDE 5 MG/1
5 TABLET ORAL PRN
Status: DISCONTINUED | OUTPATIENT
Start: 2024-06-14 | End: 2024-06-14 | Stop reason: HOSPADM

## 2024-06-14 RX ORDER — DEXAMETHASONE SODIUM PHOSPHATE 4 MG/ML
INJECTION, SOLUTION INTRA-ARTICULAR; INTRALESIONAL; INTRAMUSCULAR; INTRAVENOUS; SOFT TISSUE PRN
Status: DISCONTINUED | OUTPATIENT
Start: 2024-06-14 | End: 2024-06-14 | Stop reason: SDUPTHER

## 2024-06-14 RX ORDER — PROPOFOL 10 MG/ML
INJECTION, EMULSION INTRAVENOUS PRN
Status: DISCONTINUED | OUTPATIENT
Start: 2024-06-14 | End: 2024-06-14 | Stop reason: SDUPTHER

## 2024-06-14 RX ORDER — FENTANYL CITRATE 50 UG/ML
INJECTION, SOLUTION INTRAMUSCULAR; INTRAVENOUS PRN
Status: DISCONTINUED | OUTPATIENT
Start: 2024-06-14 | End: 2024-06-14 | Stop reason: SDUPTHER

## 2024-06-14 RX ORDER — DIPHENHYDRAMINE HYDROCHLORIDE 50 MG/ML
12.5 INJECTION INTRAMUSCULAR; INTRAVENOUS
Status: DISCONTINUED | OUTPATIENT
Start: 2024-06-14 | End: 2024-06-14 | Stop reason: HOSPADM

## 2024-06-14 RX ORDER — LIDOCAINE HYDROCHLORIDE AND EPINEPHRINE BITARTRATE 20; .01 MG/ML; MG/ML
INJECTION, SOLUTION SUBCUTANEOUS PRN
Status: DISCONTINUED | OUTPATIENT
Start: 2024-06-14 | End: 2024-06-14 | Stop reason: ALTCHOICE

## 2024-06-14 RX ORDER — SODIUM CHLORIDE 0.9 % (FLUSH) 0.9 %
5-40 SYRINGE (ML) INJECTION EVERY 12 HOURS SCHEDULED
Status: DISCONTINUED | OUTPATIENT
Start: 2024-06-14 | End: 2024-06-14 | Stop reason: HOSPADM

## 2024-06-14 RX ORDER — IBUPROFEN 800 MG/1
800 TABLET ORAL EVERY 8 HOURS PRN
Qty: 30 TABLET | Refills: 0 | Status: SHIPPED | OUTPATIENT
Start: 2024-06-14

## 2024-06-14 RX ORDER — NALOXONE HYDROCHLORIDE 0.4 MG/ML
INJECTION, SOLUTION INTRAMUSCULAR; INTRAVENOUS; SUBCUTANEOUS PRN
Status: DISCONTINUED | OUTPATIENT
Start: 2024-06-14 | End: 2024-06-14 | Stop reason: HOSPADM

## 2024-06-14 RX ORDER — SODIUM CHLORIDE 9 MG/ML
INJECTION, SOLUTION INTRAVENOUS CONTINUOUS PRN
Status: DISCONTINUED | OUTPATIENT
Start: 2024-06-14 | End: 2024-06-14 | Stop reason: SDUPTHER

## 2024-06-14 RX ORDER — ACETAMINOPHEN AND CODEINE PHOSPHATE 300; 30 MG/1; MG/1
1 TABLET ORAL EVERY 4 HOURS PRN
Qty: 12 TABLET | Refills: 0 | Status: SHIPPED | OUTPATIENT
Start: 2024-06-14 | End: 2024-06-17

## 2024-06-14 RX ORDER — SUCCINYLCHOLINE/SOD CL,ISO/PF 100 MG/5ML
SYRINGE (ML) INTRAVENOUS PRN
Status: DISCONTINUED | OUTPATIENT
Start: 2024-06-14 | End: 2024-06-14 | Stop reason: SDUPTHER

## 2024-06-14 RX ADMIN — ONDANSETRON 4 MG: 2 INJECTION INTRAMUSCULAR; INTRAVENOUS at 13:48

## 2024-06-14 RX ADMIN — LIDOCAINE HYDROCHLORIDE 100 MG: 20 INJECTION, SOLUTION EPIDURAL; INFILTRATION; INTRACAUDAL; PERINEURAL at 13:46

## 2024-06-14 RX ADMIN — ROCURONIUM BROMIDE 5 MG: 10 INJECTION, SOLUTION INTRAVENOUS at 13:46

## 2024-06-14 RX ADMIN — MIDAZOLAM 1 MG: 1 INJECTION INTRAMUSCULAR; INTRAVENOUS at 13:32

## 2024-06-14 RX ADMIN — KETAMINE HYDROCHLORIDE 25 MG: 10 INJECTION INTRAMUSCULAR; INTRAVENOUS at 13:46

## 2024-06-14 RX ADMIN — HYDROMORPHONE HYDROCHLORIDE 0.5 MG: 1 INJECTION, SOLUTION INTRAMUSCULAR; INTRAVENOUS; SUBCUTANEOUS at 14:26

## 2024-06-14 RX ADMIN — DIPHENHYDRAMINE HYDROCHLORIDE 25 MG: 50 INJECTION INTRAMUSCULAR; INTRAVENOUS at 13:49

## 2024-06-14 RX ADMIN — FENTANYL CITRATE 100 MCG: 50 INJECTION, SOLUTION INTRAMUSCULAR; INTRAVENOUS at 13:46

## 2024-06-14 RX ADMIN — DEXAMETHASONE SODIUM PHOSPHATE 8 MG: 4 INJECTION, SOLUTION INTRAMUSCULAR; INTRAVENOUS at 13:49

## 2024-06-14 RX ADMIN — HYDROMORPHONE HYDROCHLORIDE 0.5 MG: 1 INJECTION, SOLUTION INTRAMUSCULAR; INTRAVENOUS; SUBCUTANEOUS at 14:40

## 2024-06-14 RX ADMIN — GLYCOPYRROLATE 0.1 MG: 0.2 INJECTION INTRAMUSCULAR; INTRAVENOUS at 13:48

## 2024-06-14 RX ADMIN — SODIUM CHLORIDE: 9 INJECTION, SOLUTION INTRAVENOUS at 13:32

## 2024-06-14 RX ADMIN — PROPOFOL 160 MG: 10 INJECTION, EMULSION INTRAVENOUS at 13:46

## 2024-06-14 RX ADMIN — Medication 140 MG: at 13:46

## 2024-06-14 ASSESSMENT — PAIN - FUNCTIONAL ASSESSMENT
PAIN_FUNCTIONAL_ASSESSMENT: PREVENTS OR INTERFERES SOME ACTIVE ACTIVITIES AND ADLS
PAIN_FUNCTIONAL_ASSESSMENT: PREVENTS OR INTERFERES SOME ACTIVE ACTIVITIES AND ADLS
PAIN_FUNCTIONAL_ASSESSMENT: NONE - DENIES PAIN

## 2024-06-14 ASSESSMENT — PAIN DESCRIPTION - LOCATION
LOCATION: MOUTH

## 2024-06-14 ASSESSMENT — PAIN DESCRIPTION - DESCRIPTORS
DESCRIPTORS: DISCOMFORT

## 2024-06-14 ASSESSMENT — PAIN SCALES - GENERAL
PAINLEVEL_OUTOF10: 3
PAINLEVEL_OUTOF10: 7
PAINLEVEL_OUTOF10: 7
PAINLEVEL_OUTOF10: 4

## 2024-06-14 ASSESSMENT — LIFESTYLE VARIABLES: SMOKING_STATUS: 1

## 2024-06-14 ASSESSMENT — PAIN DESCRIPTION - ORIENTATION
ORIENTATION: LOWER;UPPER

## 2024-06-14 NOTE — OP NOTE
[place] [address]                            OPERATIVE REPORT      PATIENT NAME: MARCELO PATTEN                : 1975  MED REC NO: [id number]                        ROOM: OR POOL  ACCOUNT NO: [id number]                       ADMIT DATE: 2024  PROVIDER: Nabil Peng DDS      DATE OF PROCEDURE:      SURGEON:  Nabil Peng DDS    PREOPERATIVE DIAGNOSIS:  Dental caries.    POSTOPERATIVE DIAGNOSIS:  Dental caries.    ANESTHESIA:  General endotracheal.    ESTIMATED BLOOD LOSS:  Minimal.    FLUIDS:  600 mL of normal saline.    MEDICATIONS:  None.    COMPLICATIONS:  None.    PROCEDURE:  Full mouth extractions with alveoloplasty.    PREOPERATIVE NOTE:  Risks and benefits were discussed with the patient prior to surgery, the patient consented.  The patient was not a candidate for in-office sedation due to unsuccessful attempts in the office and severe anxiety.    DESCRIPTION OF PROCEDURE:  The patient was brought to the OR, supine position, prepped and draped for oral procedure.  After review of films, we extracted remaining teeth due to severe periodontal disease and caries.  We did periodontal readings and they were in the paper chart.  We started on the maxilla and extracted teeth 4, 6, 7, 8, 9, 10, and 11.  Two quadrants of alveoloplasty were done on the upper right and upper left maxilla.  3-0 Vicryl continuous suture was placed in the extraction sites.  We now concentrated on the mandible and extracted remaining mandibular teeth 19 through 29.  Two quadrants of alveoloplasty were done on the lower right and lower left mandible.  3-0 Vicryl continuous suture was placed in the extraction sites.  Throat pack was removed.  The patient was transferred to PACU in good condition.          NABIL PNEG DDS      D:  2024 14:18:46     T:  2024 15:57:54     FP/AQS  Job #:  [id number]     Doc#:  [id number]

## 2024-06-14 NOTE — PROGRESS NOTES
Patient placed on 2L nasal cannula due to low oxygen.  Patient encouraged to take deep breaths and instructed on SMI. ZHANE Gorman.

## 2024-06-14 NOTE — ANESTHESIA PRE PROCEDURE
Department of Anesthesiology  Preprocedure Note       Name:  Ann-Marie Thomas   Age:  48 y.o.  :  1975                                          MRN:  [id number]         Date:  2024      Surgeon: Surgeon(s):  Nabil Peng DDS    Procedure: Procedure(s):  FULL MOUTH DENTAL EXTRACTION    Medications prior to admission:   Prior to Admission medications    Medication Sig Start Date End Date Taking? Authorizing Provider   amoxicillin (AMOXIL) 500 MG capsule Take 1 capsule by mouth 3 times daily Pt stated she takes medication for \"pain and inflammation\" for teeth.   Yes Brandi Francis MD   atorvastatin (LIPITOR) 10 MG tablet Take 1 tablet by mouth daily   Yes Brandi Francis MD   buPROPion (WELLBUTRIN XL) 300 MG extended release tablet Take 1 tablet by mouth every morning    Brandi Francis MD   apixaban (ELIQUIS) 5 MG TABS tablet Take by mouth 2 times daily    Brandi Francis MD   flecainide (TAMBOCOR) 100 MG tablet Take 1 tablet by mouth 2 times daily    Brandi Francis MD   losartan-hydroCHLOROthiazide (HYZAAR) 50-12.5 MG per tablet Take 1 tablet by mouth daily    Brandi Francis MD   metFORMIN (GLUCOPHAGE) 500 MG tablet Take 1 tablet by mouth 2 times daily (with meals)    Brandi Francis MD   metoprolol tartrate (LOPRESSOR) 25 MG tablet Take 1 tablet by mouth 2 times daily    Brandi Francis MD   gabapentin (NEURONTIN) 300 MG capsule Take 1 capsule by mouth at bedtime.  Patient not taking: Reported on 2024    Brandi Francis MD   phentermine 15 MG capsule Take 1 capsule by mouth every morning.    Brandi Francis MD       Current medications:    No current facility-administered medications for this encounter.       Allergies:    Allergies   Allergen Reactions    Hydrocodone-Acetaminophen Nausea And Vomiting     vicoden       Problem List:    Patient Active Problem List   Diagnosis Code    Dental caries K02.9       Past Medical History:

## 2024-06-14 NOTE — ANESTHESIA POSTPROCEDURE EVALUATION
Department of Anesthesiology  Postprocedure Note    Patient: Ann-Marie Thomas  MRN: [id number]  YOB: 1975  Date of evaluation: 6/14/2024    Procedure Summary       Date: 06/14/24 Room / Location: [address]    Anesthesia Start: 1332 Anesthesia Stop:     Procedure: FULL MOUTH DENTAL EXTRACTION (Mouth) Diagnosis:       Dental caries      (Dental caries [K02.9])    Surgeons: Nabil Peng DDS Responsible Provider: Musa Christopher DO    Anesthesia Type: general ASA Status: 3            Anesthesia Type: No value filed.    Ana Phase I: Ana Score: 10    Ana Phase II:      Anesthesia Post Evaluation    Patient location during evaluation: PACU  Patient participation: complete - patient participated  Level of consciousness: sleepy but conscious  Pain score: 0  Airway patency: patent  Nausea & Vomiting: no vomiting and no nausea  Cardiovascular status: hemodynamically stable  Respiratory status: acceptable and room air  Hydration status: stable  Pain management: adequate        No notable events documented.

## 2024-06-25 ENCOUNTER — HOSPITAL ENCOUNTER (OUTPATIENT)
Dept: RADIOLOGY | Facility: HOSPITAL | Age: 49
Discharge: HOME | End: 2024-06-25
Payer: MEDICAID

## 2024-06-25 ENCOUNTER — APPOINTMENT (OUTPATIENT)
Dept: PRIMARY CARE | Facility: CLINIC | Age: 49
End: 2024-06-25
Payer: MEDICAID

## 2024-06-25 VITALS
WEIGHT: 284 LBS | HEART RATE: 66 BPM | BODY MASS INDEX: 48.49 KG/M2 | HEIGHT: 64 IN | DIASTOLIC BLOOD PRESSURE: 80 MMHG | SYSTOLIC BLOOD PRESSURE: 110 MMHG

## 2024-06-25 DIAGNOSIS — E55.9 VITAMIN D DEFICIENCY: ICD-10-CM

## 2024-06-25 DIAGNOSIS — K59.00 CONSTIPATION, UNSPECIFIED CONSTIPATION TYPE: ICD-10-CM

## 2024-06-25 DIAGNOSIS — Z13.9 VISIT FOR SCREENING: Primary | ICD-10-CM

## 2024-06-25 DIAGNOSIS — Z12.31 VISIT FOR SCREENING MAMMOGRAM: ICD-10-CM

## 2024-06-25 DIAGNOSIS — E66.01 CLASS 3 SEVERE OBESITY WITH SERIOUS COMORBIDITY AND BODY MASS INDEX (BMI) OF 45.0 TO 49.9 IN ADULT, UNSPECIFIED OBESITY TYPE (MULTI): ICD-10-CM

## 2024-06-25 DIAGNOSIS — E53.8 VITAMIN B12 DEFICIENCY: ICD-10-CM

## 2024-06-25 DIAGNOSIS — I10 BENIGN ESSENTIAL HYPERTENSION: ICD-10-CM

## 2024-06-25 DIAGNOSIS — E78.00 ELEVATED LDL CHOLESTEROL LEVEL: ICD-10-CM

## 2024-06-25 PROCEDURE — 99214 OFFICE O/P EST MOD 30 MIN: CPT | Performed by: CLINICAL NURSE SPECIALIST

## 2024-06-25 PROCEDURE — 77067 SCR MAMMO BI INCL CAD: CPT | Performed by: RADIOLOGY

## 2024-06-25 PROCEDURE — 3008F BODY MASS INDEX DOCD: CPT | Performed by: CLINICAL NURSE SPECIALIST

## 2024-06-25 PROCEDURE — 99406 BEHAV CHNG SMOKING 3-10 MIN: CPT | Performed by: CLINICAL NURSE SPECIALIST

## 2024-06-25 PROCEDURE — 3079F DIAST BP 80-89 MM HG: CPT | Performed by: CLINICAL NURSE SPECIALIST

## 2024-06-25 PROCEDURE — 77063 BREAST TOMOSYNTHESIS BI: CPT | Performed by: RADIOLOGY

## 2024-06-25 PROCEDURE — 3074F SYST BP LT 130 MM HG: CPT | Performed by: CLINICAL NURSE SPECIALIST

## 2024-06-25 PROCEDURE — 77067 SCR MAMMO BI INCL CAD: CPT

## 2024-06-25 RX ORDER — PHENTERMINE HYDROCHLORIDE 15 MG/1
15 CAPSULE ORAL
Qty: 30 CAPSULE | Refills: 0 | Status: CANCELLED | OUTPATIENT
Start: 2024-06-25 | End: 2024-07-25

## 2024-06-25 RX ORDER — DOCUSATE SODIUM 100 MG/1
100 CAPSULE, LIQUID FILLED ORAL DAILY
Qty: 30 CAPSULE | Refills: 1 | Status: SHIPPED | OUTPATIENT
Start: 2024-06-25 | End: 2024-08-24

## 2024-06-25 RX ORDER — PHENTERMINE HYDROCHLORIDE 15 MG/1
15 CAPSULE ORAL
Qty: 30 CAPSULE | Refills: 0 | Status: SHIPPED | OUTPATIENT
Start: 2024-07-08 | End: 2024-08-07

## 2024-06-25 ASSESSMENT — ENCOUNTER SYMPTOMS
HEMATURIA: 0
MYALGIAS: 0
VOMITING: 0
FATIGUE: 0
COUGH: 0
BACK PAIN: 0
WHEEZING: 0
PHOTOPHOBIA: 0
DIZZINESS: 0
LOSS OF SENSATION IN FEET: 0
CHEST TIGHTNESS: 0
ABDOMINAL PAIN: 0
WOUND: 0
NECK PAIN: 0
DEPRESSION: 0
UNEXPECTED WEIGHT CHANGE: 0
OCCASIONAL FEELINGS OF UNSTEADINESS: 0
FLANK PAIN: 0
ACTIVITY CHANGE: 0
FEVER: 0
ARTHRALGIAS: 0
HEADACHES: 0
CONSTIPATION: 0
SHORTNESS OF BREATH: 0
BRUISES/BLEEDS EASILY: 0
CONFUSION: 0
DYSURIA: 0
POLYDIPSIA: 0
EYE PAIN: 0
NAUSEA: 0
APPETITE CHANGE: 0
CHILLS: 0
BLOOD IN STOOL: 0
JOINT SWELLING: 0
SLEEP DISTURBANCE: 0
SEIZURES: 0
TROUBLE SWALLOWING: 0
PALPITATIONS: 0
DIARRHEA: 0
SORE THROAT: 0

## 2024-06-25 ASSESSMENT — PATIENT HEALTH QUESTIONNAIRE - PHQ9
SUM OF ALL RESPONSES TO PHQ9 QUESTIONS 1 AND 2: 0
1. LITTLE INTEREST OR PLEASURE IN DOING THINGS: NOT AT ALL
2. FEELING DOWN, DEPRESSED OR HOPELESS: NOT AT ALL

## 2024-06-25 NOTE — PROGRESS NOTES
Subjective   Patient ID: Kerry Garcia is a 48 y.o. female who presents for Follow-up (Follow up).  HPI    OARRS:  Jacqueline Mullins, APRN-CNS on 6/25/2024  2:35 PM  I have personally reviewed the OARRS report for Kerry Garcia. I have considered the risks of abuse, dependence, addiction and diversion and I believe that it is clinically appropriate for Kerry Garcia to be prescribed this medication    Is the patient prescribed a combination of a benzodiazepine and opioid?  No    Last Urine Drug Screen / ordered today: No  No results found for this or any previous visit (from the past 8760 hour(s)).  Results are as expected.     Controlled Substance Agreement:  Date of the Last Agreement: 04/16/2024  Reviewed Controlled Substance Agreement including but not limited to the benefits, risks, and alternatives to treatment with a Controlled Substance medication(s).    Anorexiants:   What is the patient's goal of therapy? Weight Loss  Is this being achieved with current treatment? Progressing    Patient has demonstrated continued efforts to lose weight, is dedicated to the treatment program and the response to treatment. and I have assessed for the presence or absence of contraindications, adverse effects, and indicators of possible substance abuse that would necessitate cessation of treatment utilizing controlled substance.    Activities of Daily Living:   Is your overall impression that this patient is benefiting (symptom reduction outweighs side effects) from anorexiants therapy? Yes    1. Physical Functioning: Same  2. Family Relationship: Same  3. Social Relationship: Same  4. Mood: Same  5. Sleep Patterns: Same  6. Overall Function: Same    Here today as a follow up for her Hypertension. Has been consistent with her Losartan/HCTZ. Continued on Eliquis and Flecainide. Recent follow up with Cardiology, stable.      Still working on weight loss. No longer going to [employer]. Has been trying to work on her diet.  Chronic issues with struggling to lose weight. Stopped eating fast food. Followed with Bariatrics, not sure she is going to follow back with her. Has continued her Metformin. States that she has updated her home gym and trying to be more active. Weight has been stable but she is not having any success in weight loss. Previously tolerated and responded to Phentermine. Requesting to continue medication.      She has chronic issues with Peripheral Edema, previously was on Lasix but states that it made her urinate too much. Did not feel that it was beneficial for her. Denies any complaints of chest pain or shortness of breath. Lymphedema, followed with Rehab and Vascular.      Mother previously diagnosed in her 40's with Colon Cancer, Father with Polyps removed. Patient declined having a Colonoscopy done. Cologuard negative.      Continue her Wellbutrin.      Back Pain, no improvement with OTC medications. No imaging has been completed.     Constipation, no improvement with OTC medications, Miralax. Requesting Rx.     Review of Systems   Constitutional:  Negative for activity change, appetite change, chills, fatigue, fever and unexpected weight change.   HENT:  Negative for ear pain, hearing loss, nosebleeds, sore throat, tinnitus and trouble swallowing.    Eyes:  Negative for photophobia, pain and visual disturbance.   Respiratory:  Negative for cough, chest tightness, shortness of breath and wheezing.    Cardiovascular:  Negative for chest pain, palpitations and leg swelling.   Gastrointestinal:  Negative for abdominal pain, blood in stool, constipation, diarrhea, nausea and vomiting.   Endocrine: Negative for cold intolerance, heat intolerance, polydipsia and polyuria.   Genitourinary:  Negative for dysuria, flank pain and hematuria.   Musculoskeletal:  Negative for arthralgias, back pain, joint swelling, myalgias and neck pain.   Skin:  Negative for pallor, rash and wound.   Allergic/Immunologic: Negative for  immunocompromised state.   Neurological:  Negative for dizziness, seizures and headaches.   Hematological:  Does not bruise/bleed easily.   Psychiatric/Behavioral:  Negative for confusion and sleep disturbance.        Objective   Physical Exam  Vitals and nursing note reviewed.   Constitutional:       General: She is not in acute distress.     Appearance: Normal appearance.   HENT:      Head: Normocephalic.      Nose: Nose normal.   Eyes:      Conjunctiva/sclera: Conjunctivae normal.   Neck:      Vascular: No carotid bruit.   Cardiovascular:      Rate and Rhythm: Normal rate and regular rhythm.      Pulses: Normal pulses.      Heart sounds: Normal heart sounds.   Pulmonary:      Effort: Pulmonary effort is normal.      Breath sounds: Normal breath sounds.   Abdominal:      General: Bowel sounds are normal.      Palpations: Abdomen is soft.   Musculoskeletal:         General: Normal range of motion.      Cervical back: Normal range of motion.   Skin:     General: Skin is warm and dry.   Neurological:      Mental Status: She is alert and oriented to person, place, and time. Mental status is at baseline.   Psychiatric:         Mood and Affect: Mood normal.         Behavior: Behavior normal.       Assessment/Plan          Reviewed results of blood work completed with patient.      #1. Lymphedema: Continue to follow with Specialist as previously determined.   #2. Obesity & #3. Weight Gain: BMI: 48.75. Followed with Bariatrics. Continue Phentermine, previously tolerated well. I have personally reviewed the OARRS report.  This report is scanned into the electronic medical record.  I have considered the risks of abuse, dependence, addiction and diversion.  I believe that it is clinically appropriate to prescribe this medication. Drug Screen: April 2024. Contract: April 2024.   #4. Tobacco Use: Patient advised to quit smoking tobacco. The risks of smoking were reviewed with the patient and she was offered medication and/or  smoking cessation counseling to help. Declined at this time. States that she only smokes when she drinks. Vaping. 3 minutes were spent counseling the patient on tobacco cessation.  Benefits of cessation were discussed as well as techniques to help quit.    #5. Vitamin B12 Deficiency: Vitamin B12 OTC.   #6. Hypertension, pAfib: Blood pressure controlled at OV today. Losartan/hydrochlorothiazide and Metoprolol. Encouraged ambulatory blood pressure monitoring. DASH diet. Followed with Cardiology. Continue Eliquis and Felcainide.   #7. DVT, Pulmonary Embolism: Continue Eliquis as prescribed.   #8. Back Pain: OTC pain relievers. Flexeril PRN.   #9. Insulin Resistance: Continue Metformin. Reevaluate with next lab work.   #10. Anxiety: Continue Wellbutrin. Follow up with medication effectiveness.   #11. Elevated LDL: Discussed Prescription therapy. Lifestyle changes. Continue to monitor.   #12. Vitamin D Deficiency: Reevaluate with next lab work.   #13. Constipation: Medication as prescribed.      Last Mammogram: None Available. Ordered.  Unable to update Immunization due to Insurance.   Cologuard: August 2023, negative.   Wellness: May 2024.        Jacqueline Mullins, APRN-CNS 06/25/24 2:34 PM

## 2024-06-26 ENCOUNTER — TELEPHONE (OUTPATIENT)
Dept: PRIMARY CARE | Facility: CLINIC | Age: 49
End: 2024-06-26
Payer: MEDICAID

## 2024-06-26 NOTE — TELEPHONE ENCOUNTER
----- Message from CLAUDETTE Paez-CNS sent at 6/26/2024  2:32 PM EDT -----  Please let patient know that Mammogram is negative. Thank you!

## 2024-08-05 DIAGNOSIS — G89.29 OTHER CHRONIC PAIN: ICD-10-CM

## 2024-08-05 DIAGNOSIS — E88.819 INSULIN RESISTANCE: ICD-10-CM

## 2024-08-05 DIAGNOSIS — K59.00 CONSTIPATION, UNSPECIFIED CONSTIPATION TYPE: ICD-10-CM

## 2024-08-05 DIAGNOSIS — I10 BENIGN ESSENTIAL HYPERTENSION: ICD-10-CM

## 2024-08-05 RX ORDER — METOPROLOL TARTRATE 25 MG/1
25 TABLET, FILM COATED ORAL 2 TIMES DAILY
Qty: 60 TABLET | Refills: 10 | Status: SHIPPED | OUTPATIENT
Start: 2024-08-05

## 2024-08-05 RX ORDER — LOSARTAN POTASSIUM AND HYDROCHLOROTHIAZIDE 12.5; 5 MG/1; MG/1
1 TABLET ORAL DAILY
Qty: 30 TABLET | Refills: 10 | Status: SHIPPED | OUTPATIENT
Start: 2024-08-05

## 2024-08-05 RX ORDER — DOCUSATE SODIUM 100 MG/1
100 CAPSULE, LIQUID FILLED ORAL DAILY
Qty: 30 CAPSULE | Refills: 10 | Status: SHIPPED | OUTPATIENT
Start: 2024-08-05

## 2024-08-05 RX ORDER — GABAPENTIN 300 MG/1
300 CAPSULE ORAL NIGHTLY
Qty: 30 CAPSULE | Refills: 10 | Status: SHIPPED | OUTPATIENT
Start: 2024-08-05

## 2024-08-05 RX ORDER — METFORMIN HYDROCHLORIDE 500 MG/1
500 TABLET ORAL
Qty: 60 TABLET | Refills: 10 | Status: SHIPPED | OUTPATIENT
Start: 2024-08-05

## 2024-08-20 ENCOUNTER — APPOINTMENT (OUTPATIENT)
Dept: PRIMARY CARE | Facility: CLINIC | Age: 49
End: 2024-08-20
Payer: MEDICAID

## 2024-08-22 ENCOUNTER — APPOINTMENT (OUTPATIENT)
Dept: PRIMARY CARE | Facility: CLINIC | Age: 49
End: 2024-08-22
Payer: MEDICAID

## 2024-09-25 DIAGNOSIS — I47.19 ATRIAL TACHYCARDIA (CMS-HCC): ICD-10-CM

## 2024-10-11 RX ORDER — FLECAINIDE ACETATE 100 MG/1
100 TABLET ORAL 2 TIMES DAILY
Qty: 180 TABLET | Refills: 1 | Status: SHIPPED | OUTPATIENT
Start: 2024-10-11 | End: 2025-04-09

## 2024-11-07 DIAGNOSIS — I82.4Y9 DEEP VEIN THROMBOSIS (DVT) OF PROXIMAL LOWER EXTREMITY, UNSPECIFIED CHRONICITY, UNSPECIFIED LATERALITY (MULTI): ICD-10-CM

## 2024-11-07 RX ORDER — APIXABAN 5 MG/1
5 TABLET, FILM COATED ORAL 2 TIMES DAILY
Qty: 60 TABLET | Refills: 10 | Status: SHIPPED | OUTPATIENT
Start: 2024-11-07

## 2025-01-02 DIAGNOSIS — F41.1 GENERALIZED ANXIETY DISORDER: ICD-10-CM

## 2025-01-02 RX ORDER — BUPROPION HYDROCHLORIDE 300 MG/1
TABLET ORAL
Qty: 30 TABLET | Refills: 10 | Status: SHIPPED | OUTPATIENT
Start: 2025-01-02

## 2025-01-16 ENCOUNTER — TELEPHONE (OUTPATIENT)
Dept: PRIMARY CARE | Facility: CLINIC | Age: 50
End: 2025-01-16
Payer: MEDICAID

## 2025-01-16 DIAGNOSIS — R11.2 NAUSEA AND VOMITING, UNSPECIFIED VOMITING TYPE: Primary | ICD-10-CM

## 2025-01-16 RX ORDER — ONDANSETRON 4 MG/1
4 TABLET, ORALLY DISINTEGRATING ORAL EVERY 8 HOURS PRN
Qty: 20 TABLET | Refills: 0 | Status: SHIPPED | OUTPATIENT
Start: 2025-01-16 | End: 2025-01-23

## 2025-01-16 NOTE — TELEPHONE ENCOUNTER
Chief Complaint :    Symptoms: throwing up, upset stomach, light headed, nausea     Duration: 5 days     Treatments:    Patient Requesting: Anti nausea medication    Did the Patient have the covid Vaccine:    Pharmacy: [facility]     Covid Tested:

## 2025-01-29 ENCOUNTER — APPOINTMENT (OUTPATIENT)
Dept: PRIMARY CARE | Facility: CLINIC | Age: 50
End: 2025-01-29
Payer: MEDICAID

## 2025-01-29 VITALS
WEIGHT: 292 LBS | HEIGHT: 64 IN | SYSTOLIC BLOOD PRESSURE: 112 MMHG | DIASTOLIC BLOOD PRESSURE: 80 MMHG | HEART RATE: 57 BPM | BODY MASS INDEX: 49.85 KG/M2

## 2025-01-29 DIAGNOSIS — E55.9 VITAMIN D DEFICIENCY: ICD-10-CM

## 2025-01-29 DIAGNOSIS — E66.01 CLASS 3 SEVERE OBESITY WITH SERIOUS COMORBIDITY AND BODY MASS INDEX (BMI) OF 45.0 TO 49.9 IN ADULT, UNSPECIFIED OBESITY TYPE: ICD-10-CM

## 2025-01-29 DIAGNOSIS — F41.1 GENERALIZED ANXIETY DISORDER: ICD-10-CM

## 2025-01-29 DIAGNOSIS — Z00.00 HEALTHCARE MAINTENANCE: ICD-10-CM

## 2025-01-29 DIAGNOSIS — I10 BENIGN ESSENTIAL HYPERTENSION: ICD-10-CM

## 2025-01-29 DIAGNOSIS — Z12.31 VISIT FOR SCREENING MAMMOGRAM: ICD-10-CM

## 2025-01-29 DIAGNOSIS — E66.813 CLASS 3 SEVERE OBESITY WITH SERIOUS COMORBIDITY AND BODY MASS INDEX (BMI) OF 45.0 TO 49.9 IN ADULT, UNSPECIFIED OBESITY TYPE: ICD-10-CM

## 2025-01-29 DIAGNOSIS — E88.819 INSULIN RESISTANCE: ICD-10-CM

## 2025-01-29 DIAGNOSIS — I48.0 PAROXYSMAL ATRIAL FIBRILLATION (MULTI): Primary | ICD-10-CM

## 2025-01-29 DIAGNOSIS — E78.00 ELEVATED LDL CHOLESTEROL LEVEL: ICD-10-CM

## 2025-01-29 DIAGNOSIS — I82.4Y9 DEEP VEIN THROMBOSIS (DVT) OF PROXIMAL LOWER EXTREMITY, UNSPECIFIED CHRONICITY, UNSPECIFIED LATERALITY (MULTI): ICD-10-CM

## 2025-01-29 DIAGNOSIS — E53.8 VITAMIN B12 DEFICIENCY: ICD-10-CM

## 2025-01-29 PROCEDURE — 3074F SYST BP LT 130 MM HG: CPT | Performed by: CLINICAL NURSE SPECIALIST

## 2025-01-29 PROCEDURE — 99396 PREV VISIT EST AGE 40-64: CPT | Performed by: CLINICAL NURSE SPECIALIST

## 2025-01-29 PROCEDURE — 4004F PT TOBACCO SCREEN RCVD TLK: CPT | Performed by: CLINICAL NURSE SPECIALIST

## 2025-01-29 PROCEDURE — 3008F BODY MASS INDEX DOCD: CPT | Performed by: CLINICAL NURSE SPECIALIST

## 2025-01-29 PROCEDURE — 99406 BEHAV CHNG SMOKING 3-10 MIN: CPT | Performed by: CLINICAL NURSE SPECIALIST

## 2025-01-29 PROCEDURE — 3079F DIAST BP 80-89 MM HG: CPT | Performed by: CLINICAL NURSE SPECIALIST

## 2025-01-29 RX ORDER — PHENTERMINE AND TOPIRAMATE 3.75; 23 MG/1; MG/1
1 CAPSULE, EXTENDED RELEASE ORAL DAILY
Qty: 30 CAPSULE | Refills: 0 | Status: SHIPPED | OUTPATIENT
Start: 2025-01-29 | End: 2025-02-28

## 2025-01-29 RX ORDER — BUPROPION HYDROCHLORIDE 300 MG/1
300 TABLET ORAL DAILY
Qty: 90 TABLET | Refills: 3 | Status: SHIPPED | OUTPATIENT
Start: 2025-01-29 | End: 2026-01-24

## 2025-01-29 RX ORDER — ATORVASTATIN CALCIUM 10 MG/1
10 TABLET, FILM COATED ORAL DAILY
Qty: 90 TABLET | Refills: 3 | Status: SHIPPED | OUTPATIENT
Start: 2025-01-29 | End: 2026-01-24

## 2025-01-29 ASSESSMENT — ENCOUNTER SYMPTOMS
CHEST TIGHTNESS: 0
SLEEP DISTURBANCE: 0
ACTIVITY CHANGE: 0
UNEXPECTED WEIGHT CHANGE: 0
DEPRESSION: 0
SHORTNESS OF BREATH: 0
PALPITATIONS: 0
SEIZURES: 0
CONSTIPATION: 0
DYSURIA: 0
CHILLS: 0
CONFUSION: 0
LOSS OF SENSATION IN FEET: 0
BRUISES/BLEEDS EASILY: 0
HEMATURIA: 0
VOMITING: 0
DIZZINESS: 0
TROUBLE SWALLOWING: 0
BACK PAIN: 0
WOUND: 0
EYE PAIN: 0
ARTHRALGIAS: 0
FEVER: 0
JOINT SWELLING: 0
DIARRHEA: 0
MYALGIAS: 0
FATIGUE: 0
NAUSEA: 0
POLYDIPSIA: 0
FLANK PAIN: 0
WHEEZING: 0
NECK PAIN: 0
BLOOD IN STOOL: 0
OCCASIONAL FEELINGS OF UNSTEADINESS: 0
ABDOMINAL PAIN: 0
COUGH: 0
PHOTOPHOBIA: 0
HEADACHES: 0
SORE THROAT: 0
APPETITE CHANGE: 0

## 2025-01-29 ASSESSMENT — PATIENT HEALTH QUESTIONNAIRE - PHQ9
1. LITTLE INTEREST OR PLEASURE IN DOING THINGS: NOT AT ALL
SUM OF ALL RESPONSES TO PHQ9 QUESTIONS 1 AND 2: 0
2. FEELING DOWN, DEPRESSED OR HOPELESS: NOT AT ALL

## 2025-01-29 NOTE — PROGRESS NOTES
Subjective   Patient ID: Kerry Garcia is a 49 y.o. female who presents for Annual Exam (Wellness exam ).  [facility]    OARRS:  Jacqueline Mullins, APRN-CNS on 1/29/2025 10:56 AM  I have personally reviewed the OARRS report for Kerry Garcia. I have considered the risks of abuse, dependence, addiction and diversion and I believe that it is clinically appropriate for Kerry Garcia to be prescribed this medication    Is the patient prescribed a combination of a benzodiazepine and opioid?  No    Last Urine Drug Screen / ordered today: No  No results found for this or any previous visit (from the past 8760 hours).  Results are as expected.     Controlled Substance Agreement:  Date of the Last Agreement: 04/16/2024  Reviewed Controlled Substance Agreement including but not limited to the benefits, risks, and alternatives to treatment with a Controlled Substance medication(s).    Anorexiants:   What is the patient's goal of therapy? Weight Loss  Is this being achieved with current treatment? New medication, previously on Phentermine alone.     I have assessed the patient's continuing efforts to lose weight., I have assessed the patient's dedication to the treatment program and the response to treatment., and I have assessed the presence or absence of contraindications, adverse effects, and indicators of possible substance abuse that would necessitate cessation of treatment utilizing controlled substance.    Activities of Daily Living:   Is your overall impression that this patient is benefiting (symptom reduction outweighs side effects) from anorexiants therapy? No  and New prescription.     1. Physical Functioning: Same  2. Family Relationship: Same  3. Social Relationship: Same  4. Mood: Same  5. Sleep Patterns: Same  6. Overall Function: Same      Here today as a follow up appointment. Due for a Wellness exam. Follow up for her Hypertension. Has been consistent with her Losartan/HCTZ. Continued on Eliquis and Flecainide. Last  visit with Cardiology, May 2024, stable.      Still working on weight loss. No longer going to [employer]. Has been trying to work on her diet. Chronic issues with struggling to lose weight. Stopped eating fast food. Followed with Bariatrics. States that she has updated her home gym and trying to be more active. Weight has been stable but she is not having any success in weight loss. Previously completed Phentermine.  Interested in Qsymia.      She has chronic issues with Peripheral Edema, previously was on Lasix but states that it made her urinate too much. Did not feel that it was beneficial for her. Denies any complaints of chest pain or shortness of breath. Lymphedema, followed with Rehab and Vascular.      Mother previously diagnosed in her 40's with Colon Cancer, Father with Polyps removed. Patient declined having a Colonoscopy done. Cologuard negative.      Continue her Wellbutrin.      Constipation, no improvement with OTC medications, Miralax. Requesting Rx.     Review of Systems   Constitutional:  Negative for activity change, appetite change, chills, fatigue, fever and unexpected weight change.   HENT:  Negative for ear pain, hearing loss, nosebleeds, sore throat, tinnitus and trouble swallowing.    Eyes:  Negative for photophobia, pain and visual disturbance.   Respiratory:  Negative for cough, chest tightness, shortness of breath and wheezing.    Cardiovascular:  Negative for chest pain, palpitations and leg swelling.   Gastrointestinal:  Negative for abdominal pain, blood in stool, constipation, diarrhea, nausea and vomiting.   Endocrine: Negative for cold intolerance, heat intolerance, polydipsia and polyuria.   Genitourinary:  Negative for dysuria, flank pain and hematuria.   Musculoskeletal:  Negative for arthralgias, back pain, joint swelling, myalgias and neck pain.   Skin:  Negative for pallor, rash and wound.   Allergic/Immunologic: Negative for immunocompromised state.   Neurological:   Negative for dizziness, seizures and headaches.   Hematological:  Does not bruise/bleed easily.   Psychiatric/Behavioral:  Negative for confusion and sleep disturbance.        Objective   Physical Exam  Vitals and nursing note reviewed.   Constitutional:       General: She is not in acute distress.     Appearance: Normal appearance.   HENT:      Head: Normocephalic.      Nose: Nose normal.   Eyes:      Conjunctiva/sclera: Conjunctivae normal.   Neck:      Vascular: No carotid bruit.   Cardiovascular:      Rate and Rhythm: Normal rate and regular rhythm.      Pulses: Normal pulses.      Heart sounds: Normal heart sounds.   Pulmonary:      Effort: Pulmonary effort is normal.      Breath sounds: Normal breath sounds.   Abdominal:      General: Bowel sounds are normal.      Palpations: Abdomen is soft.   Musculoskeletal:         General: Normal range of motion.      Cervical back: Normal range of motion.   Skin:     General: Skin is warm and dry.   Neurological:      Mental Status: She is alert and oriented to person, place, and time. Mental status is at baseline.   Psychiatric:         Mood and Affect: Mood normal.         Behavior: Behavior normal.       Assessment/Plan        Due for updated lab work. Ordered.      #1. Lymphedema: Continue to follow with Specialist as previously determined.   #2. Obesity & #3. Weight Gain: BMI: 50.12. Followed with Bariatrics. Completed Phentermine. Qsymia. I have personally reviewed the OARRS report.  This report is scanned into the electronic medical record.  I have considered the risks of abuse, dependence, addiction and diversion.  I believe that it is clinically appropriate to prescribe this medication. Drug Screen: April 2024. Contract: April 2024.   #4. Tobacco Use: Patient advised to quit smoking tobacco. The risks of smoking were reviewed with the patient and she was offered medication and/or smoking cessation counseling to help. Declined at this time. States that she only  smokes when she drinks. Vaping. 3 minutes were spent counseling the patient on tobacco cessation.  Benefits of cessation were discussed as well as techniques to help quit.    #5. Vitamin B12 Deficiency: Vitamin B12 OTC.   #6. Hypertension, pAfib: Blood pressure controlled at OV today. Losartan/hydrochlorothiazide and Metoprolol. Encouraged ambulatory blood pressure monitoring. DASH diet. Followed with Cardiology. Continue Eliquis and Felcainide.   #7. DVT, Pulmonary Embolism: Continue Eliquis as prescribed.   #8. Back Pain: OTC pain relievers. Flexeril PRN.   #9. Insulin Resistance: Continue Metformin. Reevaluate with next lab work.   #10. Anxiety: Continue Wellbutrin. Follow up with medication effectiveness.   #11. Elevated LDL: Discussed Prescription therapy. Lifestyle changes. Continue to monitor.   #12. Vitamin D Deficiency: Reevaluate with next lab work.   #13. Constipation: Medication as prescribed.   #14. Wellness: Routine and age appropriate recommendations discussed with the patient today and patient verbalized understanding of the recommendations.  Questions answered.  Age appropriate immunizations and preventative screenings discussed with the patient and ordered as appropriate. Labs updated and ordered as indicated. Recommend healthy diet and daily exercise to maintain healthy body weight.      Last Mammogram: June 2024, negative.   Unable to update Immunization due to Insurance.   Cologuard: August 2023, negative.   Wellness: January 2025.      CLAUDETTE Paez-CNS 01/29/25 10:30 AM

## 2025-03-03 ENCOUNTER — APPOINTMENT (OUTPATIENT)
Dept: RADIOLOGY | Facility: HOSPITAL | Age: 50
End: 2025-03-03
Payer: MEDICAID

## 2025-03-03 ENCOUNTER — HOSPITAL ENCOUNTER (OUTPATIENT)
Facility: HOSPITAL | Age: 50
Setting detail: OBSERVATION
LOS: 1 days | Discharge: HOME | End: 2025-03-07
Attending: EMERGENCY MEDICINE | Admitting: HOSPITALIST
Payer: MEDICAID

## 2025-03-03 ENCOUNTER — APPOINTMENT (OUTPATIENT)
Dept: CARDIOLOGY | Facility: HOSPITAL | Age: 50
End: 2025-03-03
Payer: MEDICAID

## 2025-03-03 DIAGNOSIS — R06.02 SHORTNESS OF BREATH: ICD-10-CM

## 2025-03-03 DIAGNOSIS — I48.11 LONGSTANDING PERSISTENT ATRIAL FIBRILLATION (MULTI): ICD-10-CM

## 2025-03-03 DIAGNOSIS — R06.00 DYSPNEA, UNSPECIFIED TYPE: ICD-10-CM

## 2025-03-03 DIAGNOSIS — I51.9 LV DYSFUNCTION: ICD-10-CM

## 2025-03-03 DIAGNOSIS — I48.91 UNSPECIFIED ATRIAL FIBRILLATION (MULTI): ICD-10-CM

## 2025-03-03 DIAGNOSIS — I48.0 PAROXYSMAL ATRIAL FIBRILLATION (MULTI): Primary | ICD-10-CM

## 2025-03-03 DIAGNOSIS — I10 BENIGN ESSENTIAL HYPERTENSION: ICD-10-CM

## 2025-03-03 LAB
ALBUMIN SERPL BCP-MCNC: 4.3 G/DL (ref 3.4–5)
ALP SERPL-CCNC: 87 U/L (ref 33–110)
ALT SERPL W P-5'-P-CCNC: 38 U/L (ref 7–45)
ANION GAP SERPL CALC-SCNC: 11 MMOL/L (ref 10–20)
AST SERPL W P-5'-P-CCNC: 26 U/L (ref 9–39)
BASOPHILS # BLD AUTO: 0.07 X10*3/UL (ref 0–0.1)
BASOPHILS NFR BLD AUTO: 0.9 %
BILIRUB SERPL-MCNC: 0.4 MG/DL (ref 0–1.2)
BNP SERPL-MCNC: 300 PG/ML (ref 0–99)
BUN SERPL-MCNC: 22 MG/DL (ref 6–23)
CALCIUM SERPL-MCNC: 9.8 MG/DL (ref 8.6–10.3)
CARDIAC TROPONIN I PNL SERPL HS: <3 NG/L (ref 0–13)
CHLORIDE SERPL-SCNC: 104 MMOL/L (ref 98–107)
CO2 SERPL-SCNC: 28 MMOL/L (ref 21–32)
CREAT SERPL-MCNC: 1.2 MG/DL (ref 0.5–1.05)
EGFRCR SERPLBLD CKD-EPI 2021: 55 ML/MIN/1.73M*2
EOSINOPHIL # BLD AUTO: 0.12 X10*3/UL (ref 0–0.7)
EOSINOPHIL NFR BLD AUTO: 1.6 %
ERYTHROCYTE [DISTWIDTH] IN BLOOD BY AUTOMATED COUNT: 12.6 % (ref 11.5–14.5)
GLUCOSE SERPL-MCNC: 90 MG/DL (ref 74–99)
HCT VFR BLD AUTO: 46.9 % (ref 36–46)
HGB BLD-MCNC: 15.8 G/DL (ref 12–16)
IMM GRANULOCYTES # BLD AUTO: 0.04 X10*3/UL (ref 0–0.7)
IMM GRANULOCYTES NFR BLD AUTO: 0.5 % (ref 0–0.9)
INR PPP: 1.2 (ref 0.9–1.1)
LYMPHOCYTES # BLD AUTO: 2.57 X10*3/UL (ref 1.2–4.8)
LYMPHOCYTES NFR BLD AUTO: 34 %
MAGNESIUM SERPL-MCNC: 1.91 MG/DL (ref 1.6–2.4)
MCH RBC QN AUTO: 31.5 PG (ref 26–34)
MCHC RBC AUTO-ENTMCNC: 33.7 G/DL (ref 32–36)
MCV RBC AUTO: 93 FL (ref 80–100)
MONOCYTES # BLD AUTO: 0.82 X10*3/UL (ref 0.1–1)
MONOCYTES NFR BLD AUTO: 10.8 %
NEUTROPHILS # BLD AUTO: 3.94 X10*3/UL (ref 1.2–7.7)
NEUTROPHILS NFR BLD AUTO: 52.2 %
NRBC BLD-RTO: 0 /100 WBCS (ref 0–0)
PLATELET # BLD AUTO: 233 X10*3/UL (ref 150–450)
POTASSIUM SERPL-SCNC: 4 MMOL/L (ref 3.5–5.3)
PROT SERPL-MCNC: 7.6 G/DL (ref 6.4–8.2)
PROTHROMBIN TIME: 13.8 SECONDS (ref 9.8–12.4)
RBC # BLD AUTO: 5.02 X10*6/UL (ref 4–5.2)
SODIUM SERPL-SCNC: 139 MMOL/L (ref 136–145)
WBC # BLD AUTO: 7.6 X10*3/UL (ref 4.4–11.3)

## 2025-03-03 PROCEDURE — 71045 X-RAY EXAM CHEST 1 VIEW: CPT | Performed by: RADIOLOGY

## 2025-03-03 PROCEDURE — 84484 ASSAY OF TROPONIN QUANT: CPT | Performed by: EMERGENCY MEDICINE

## 2025-03-03 PROCEDURE — 36415 COLL VENOUS BLD VENIPUNCTURE: CPT | Performed by: EMERGENCY MEDICINE

## 2025-03-03 PROCEDURE — 93005 ELECTROCARDIOGRAM TRACING: CPT

## 2025-03-03 PROCEDURE — 85025 COMPLETE CBC W/AUTO DIFF WBC: CPT | Performed by: EMERGENCY MEDICINE

## 2025-03-03 PROCEDURE — 2500000005 HC RX 250 GENERAL PHARMACY W/O HCPCS: Performed by: EMERGENCY MEDICINE

## 2025-03-03 PROCEDURE — 71275 CT ANGIOGRAPHY CHEST: CPT

## 2025-03-03 PROCEDURE — 80053 COMPREHEN METABOLIC PANEL: CPT | Performed by: EMERGENCY MEDICINE

## 2025-03-03 PROCEDURE — 2550000001 HC RX 255 CONTRASTS: Performed by: EMERGENCY MEDICINE

## 2025-03-03 PROCEDURE — 83735 ASSAY OF MAGNESIUM: CPT | Performed by: EMERGENCY MEDICINE

## 2025-03-03 PROCEDURE — 71045 X-RAY EXAM CHEST 1 VIEW: CPT

## 2025-03-03 PROCEDURE — 83880 ASSAY OF NATRIURETIC PEPTIDE: CPT | Performed by: EMERGENCY MEDICINE

## 2025-03-03 PROCEDURE — 99285 EMERGENCY DEPT VISIT HI MDM: CPT | Mod: 25 | Performed by: EMERGENCY MEDICINE

## 2025-03-03 PROCEDURE — 85610 PROTHROMBIN TIME: CPT | Performed by: EMERGENCY MEDICINE

## 2025-03-03 PROCEDURE — 71275 CT ANGIOGRAPHY CHEST: CPT | Performed by: STUDENT IN AN ORGANIZED HEALTH CARE EDUCATION/TRAINING PROGRAM

## 2025-03-03 PROCEDURE — 96374 THER/PROPH/DIAG INJ IV PUSH: CPT | Mod: 59

## 2025-03-03 RX ORDER — DILTIAZEM HYDROCHLORIDE 5 MG/ML
10 INJECTION INTRAVENOUS ONCE
Status: COMPLETED | OUTPATIENT
Start: 2025-03-03 | End: 2025-03-03

## 2025-03-03 RX ADMIN — IOHEXOL 75 ML: 350 INJECTION, SOLUTION INTRAVENOUS at 22:49

## 2025-03-03 RX ADMIN — DILTIAZEM HYDROCHLORIDE 10 MG: 5 INJECTION, SOLUTION INTRAVENOUS at 21:54

## 2025-03-03 ASSESSMENT — LIFESTYLE VARIABLES
EVER FELT BAD OR GUILTY ABOUT YOUR DRINKING: NO
EVER HAD A DRINK FIRST THING IN THE MORNING TO STEADY YOUR NERVES TO GET RID OF A HANGOVER: NO
HAVE YOU EVER FELT YOU SHOULD CUT DOWN ON YOUR DRINKING: NO
HAVE PEOPLE ANNOYED YOU BY CRITICIZING YOUR DRINKING: NO
TOTAL SCORE: 0

## 2025-03-03 ASSESSMENT — COLUMBIA-SUICIDE SEVERITY RATING SCALE - C-SSRS
1. IN THE PAST MONTH, HAVE YOU WISHED YOU WERE DEAD OR WISHED YOU COULD GO TO SLEEP AND NOT WAKE UP?: NO
2. HAVE YOU ACTUALLY HAD ANY THOUGHTS OF KILLING YOURSELF?: NO
6. HAVE YOU EVER DONE ANYTHING, STARTED TO DO ANYTHING, OR PREPARED TO DO ANYTHING TO END YOUR LIFE?: NO

## 2025-03-03 ASSESSMENT — PAIN - FUNCTIONAL ASSESSMENT: PAIN_FUNCTIONAL_ASSESSMENT: 0-10

## 2025-03-03 ASSESSMENT — PAIN SCALES - GENERAL
PAINLEVEL_OUTOF10: 0 - NO PAIN
PAINLEVEL_OUTOF10: 0 - NO PAIN

## 2025-03-04 ENCOUNTER — APPOINTMENT (OUTPATIENT)
Dept: CARDIOLOGY | Facility: HOSPITAL | Age: 50
End: 2025-03-04
Payer: MEDICAID

## 2025-03-04 LAB
ANION GAP SERPL CALC-SCNC: 10 MMOL/L (ref 10–20)
APPEARANCE UR: CLEAR
ATRIAL RATE: 144 BPM
ATRIAL RATE: 149 BPM
BILIRUB UR STRIP.AUTO-MCNC: NEGATIVE MG/DL
BUN SERPL-MCNC: 22 MG/DL (ref 6–23)
CALCIUM SERPL-MCNC: 9.4 MG/DL (ref 8.6–10.3)
CHLORIDE SERPL-SCNC: 103 MMOL/L (ref 98–107)
CO2 SERPL-SCNC: 31 MMOL/L (ref 21–32)
COLOR UR: ABNORMAL
CREAT SERPL-MCNC: 1.14 MG/DL (ref 0.5–1.05)
EGFRCR SERPLBLD CKD-EPI 2021: 58 ML/MIN/1.73M*2
ERYTHROCYTE [DISTWIDTH] IN BLOOD BY AUTOMATED COUNT: 12.7 % (ref 11.5–14.5)
FLUAV RNA RESP QL NAA+PROBE: NOT DETECTED
FLUBV RNA RESP QL NAA+PROBE: NOT DETECTED
GLUCOSE SERPL-MCNC: 131 MG/DL (ref 74–99)
GLUCOSE UR STRIP.AUTO-MCNC: NORMAL MG/DL
HCT VFR BLD AUTO: 42.7 % (ref 36–46)
HGB BLD-MCNC: 14.3 G/DL (ref 12–16)
HOLD SPECIMEN: NORMAL
KETONES UR STRIP.AUTO-MCNC: NEGATIVE MG/DL
LEUKOCYTE ESTERASE UR QL STRIP.AUTO: NEGATIVE
MCH RBC QN AUTO: 31.9 PG (ref 26–34)
MCHC RBC AUTO-ENTMCNC: 33.5 G/DL (ref 32–36)
MCV RBC AUTO: 95 FL (ref 80–100)
NITRITE UR QL STRIP.AUTO: NEGATIVE
NRBC BLD-RTO: 0 /100 WBCS (ref 0–0)
P AXIS: -27 DEGREES
P AXIS: 18 DEGREES
PH UR STRIP.AUTO: 6 [PH]
PLATELET # BLD AUTO: 234 X10*3/UL (ref 150–450)
POTASSIUM SERPL-SCNC: 3.8 MMOL/L (ref 3.5–5.3)
PR INTERVAL: 189 MS
PR INTERVAL: 191 MS
PROT UR STRIP.AUTO-MCNC: NEGATIVE MG/DL
Q ONSET: 252 MS
Q ONSET: 253 MS
QRS COUNT: 16 BEATS
QRS COUNT: 16 BEATS
QRS DURATION: 100 MS
QRS DURATION: 101 MS
QT INTERVAL: 376 MS
QT INTERVAL: 379 MS
QTC CALCULATION(BAZETT): 487 MS
QTC CALCULATION(BAZETT): 502 MS
QTC FREDERICIA: 447 MS
QTC FREDERICIA: 456 MS
R AXIS: -46 DEGREES
R AXIS: -53 DEGREES
RBC # BLD AUTO: 4.48 X10*6/UL (ref 4–5.2)
RBC # UR STRIP.AUTO: NEGATIVE MG/DL
SARS-COV-2 RNA RESP QL NAA+PROBE: NOT DETECTED
SODIUM SERPL-SCNC: 140 MMOL/L (ref 136–145)
SP GR UR STRIP.AUTO: 1.04
T AXIS: 55 DEGREES
T AXIS: 56 DEGREES
T OFFSET: 440 MS
T OFFSET: 443 MS
TSH SERPL-ACNC: 3.55 MIU/L (ref 0.44–3.98)
UROBILINOGEN UR STRIP.AUTO-MCNC: NORMAL MG/DL
VENTRICULAR RATE: 107 BPM
VENTRICULAR RATE: 99 BPM
WBC # BLD AUTO: 8.2 X10*3/UL (ref 4.4–11.3)

## 2025-03-04 PROCEDURE — 93005 ELECTROCARDIOGRAM TRACING: CPT

## 2025-03-04 PROCEDURE — 99254 IP/OBS CNSLTJ NEW/EST MOD 60: CPT | Performed by: STUDENT IN AN ORGANIZED HEALTH CARE EDUCATION/TRAINING PROGRAM

## 2025-03-04 PROCEDURE — 2500000001 HC RX 250 WO HCPCS SELF ADMINISTERED DRUGS (ALT 637 FOR MEDICARE OP): Performed by: INTERNAL MEDICINE

## 2025-03-04 PROCEDURE — 80048 BASIC METABOLIC PNL TOTAL CA: CPT | Performed by: INTERNAL MEDICINE

## 2025-03-04 PROCEDURE — 84443 ASSAY THYROID STIM HORMONE: CPT | Performed by: HOSPITALIST

## 2025-03-04 PROCEDURE — 85027 COMPLETE CBC AUTOMATED: CPT | Performed by: INTERNAL MEDICINE

## 2025-03-04 PROCEDURE — 81003 URINALYSIS AUTO W/O SCOPE: CPT | Performed by: EMERGENCY MEDICINE

## 2025-03-04 PROCEDURE — 2500000004 HC RX 250 GENERAL PHARMACY W/ HCPCS (ALT 636 FOR OP/ED): Performed by: HOSPITALIST

## 2025-03-04 PROCEDURE — 99223 1ST HOSP IP/OBS HIGH 75: CPT | Performed by: INTERNAL MEDICINE

## 2025-03-04 PROCEDURE — 36415 COLL VENOUS BLD VENIPUNCTURE: CPT | Performed by: INTERNAL MEDICINE

## 2025-03-04 PROCEDURE — 2060000001 HC INTERMEDIATE ICU ROOM DAILY

## 2025-03-04 PROCEDURE — 87636 SARSCOV2 & INF A&B AMP PRB: CPT | Performed by: HOSPITALIST

## 2025-03-04 PROCEDURE — 96375 TX/PRO/DX INJ NEW DRUG ADDON: CPT | Mod: 59

## 2025-03-04 PROCEDURE — 2500000001 HC RX 250 WO HCPCS SELF ADMINISTERED DRUGS (ALT 637 FOR MEDICARE OP): Performed by: HOSPITALIST

## 2025-03-04 PROCEDURE — 2500000004 HC RX 250 GENERAL PHARMACY W/ HCPCS (ALT 636 FOR OP/ED): Performed by: INTERNAL MEDICINE

## 2025-03-04 RX ORDER — ATORVASTATIN CALCIUM 10 MG/1
10 TABLET, FILM COATED ORAL DAILY
Status: DISCONTINUED | OUTPATIENT
Start: 2025-03-04 | End: 2025-03-07 | Stop reason: HOSPADM

## 2025-03-04 RX ORDER — METOPROLOL TARTRATE 25 MG/1
25 TABLET, FILM COATED ORAL 2 TIMES DAILY
Status: DISCONTINUED | OUTPATIENT
Start: 2025-03-04 | End: 2025-03-04

## 2025-03-04 RX ORDER — POLYETHYLENE GLYCOL 3350 17 G/17G
17 POWDER, FOR SOLUTION ORAL 2 TIMES DAILY PRN
Status: DISCONTINUED | OUTPATIENT
Start: 2025-03-04 | End: 2025-03-07 | Stop reason: HOSPADM

## 2025-03-04 RX ORDER — ALUMINUM HYDROXIDE, MAGNESIUM HYDROXIDE, AND SIMETHICONE 1200; 120; 1200 MG/30ML; MG/30ML; MG/30ML
30 SUSPENSION ORAL EVERY 6 HOURS PRN
Status: DISCONTINUED | OUTPATIENT
Start: 2025-03-04 | End: 2025-03-07 | Stop reason: HOSPADM

## 2025-03-04 RX ORDER — BUPROPION HYDROCHLORIDE 150 MG/1
300 TABLET ORAL DAILY
Status: DISCONTINUED | OUTPATIENT
Start: 2025-03-04 | End: 2025-03-07 | Stop reason: HOSPADM

## 2025-03-04 RX ORDER — ONDANSETRON HYDROCHLORIDE 2 MG/ML
4 INJECTION, SOLUTION INTRAVENOUS EVERY 8 HOURS PRN
Status: DISCONTINUED | OUTPATIENT
Start: 2025-03-04 | End: 2025-03-07 | Stop reason: HOSPADM

## 2025-03-04 RX ORDER — FUROSEMIDE 10 MG/ML
40 INJECTION INTRAMUSCULAR; INTRAVENOUS ONCE
Status: COMPLETED | OUTPATIENT
Start: 2025-03-04 | End: 2025-03-04

## 2025-03-04 RX ORDER — GUAIFENESIN 600 MG/1
600 TABLET, EXTENDED RELEASE ORAL EVERY 12 HOURS PRN
Status: DISCONTINUED | OUTPATIENT
Start: 2025-03-04 | End: 2025-03-07 | Stop reason: HOSPADM

## 2025-03-04 RX ORDER — METOPROLOL TARTRATE 1 MG/ML
5 INJECTION, SOLUTION INTRAVENOUS EVERY 6 HOURS PRN
Status: DISCONTINUED | OUTPATIENT
Start: 2025-03-04 | End: 2025-03-07 | Stop reason: HOSPADM

## 2025-03-04 RX ORDER — ONDANSETRON 4 MG/1
4 TABLET, FILM COATED ORAL EVERY 8 HOURS PRN
Status: DISCONTINUED | OUTPATIENT
Start: 2025-03-04 | End: 2025-03-07 | Stop reason: HOSPADM

## 2025-03-04 RX ORDER — GUAIFENESIN/DEXTROMETHORPHAN 100-10MG/5
5 SYRUP ORAL EVERY 4 HOURS PRN
Status: DISCONTINUED | OUTPATIENT
Start: 2025-03-04 | End: 2025-03-07 | Stop reason: HOSPADM

## 2025-03-04 RX ORDER — TALC
3 POWDER (GRAM) TOPICAL NIGHTLY PRN
Status: DISCONTINUED | OUTPATIENT
Start: 2025-03-04 | End: 2025-03-07 | Stop reason: HOSPADM

## 2025-03-04 RX ORDER — LANOLIN ALCOHOL/MO/W.PET/CERES
400 CREAM (GRAM) TOPICAL 2 TIMES DAILY
Status: COMPLETED | OUTPATIENT
Start: 2025-03-04 | End: 2025-03-05

## 2025-03-04 RX ORDER — GABAPENTIN 300 MG/1
300 CAPSULE ORAL NIGHTLY
Status: DISCONTINUED | OUTPATIENT
Start: 2025-03-04 | End: 2025-03-07 | Stop reason: HOSPADM

## 2025-03-04 RX ORDER — FLECAINIDE ACETATE 100 MG/1
100 TABLET ORAL 2 TIMES DAILY
Status: DISCONTINUED | OUTPATIENT
Start: 2025-03-04 | End: 2025-03-05

## 2025-03-04 RX ORDER — METOPROLOL TARTRATE 50 MG/1
50 TABLET ORAL 2 TIMES DAILY
Status: DISCONTINUED | OUTPATIENT
Start: 2025-03-04 | End: 2025-03-06

## 2025-03-04 RX ADMIN — ATORVASTATIN CALCIUM 10 MG: 10 TABLET, FILM COATED ORAL at 09:33

## 2025-03-04 RX ADMIN — BUPROPION HYDROCHLORIDE 300 MG: 150 TABLET, EXTENDED RELEASE ORAL at 09:33

## 2025-03-04 RX ADMIN — METOPROLOL TARTRATE 50 MG: 50 TABLET, FILM COATED ORAL at 20:18

## 2025-03-04 RX ADMIN — GABAPENTIN 300 MG: 300 CAPSULE ORAL at 20:18

## 2025-03-04 RX ADMIN — METOPROLOL TARTRATE 25 MG: 25 TABLET, FILM COATED ORAL at 09:34

## 2025-03-04 RX ADMIN — APIXABAN 5 MG: 5 TABLET, FILM COATED ORAL at 09:34

## 2025-03-04 RX ADMIN — FLECAINIDE ACETATE 100 MG: 100 TABLET ORAL at 09:34

## 2025-03-04 RX ADMIN — FUROSEMIDE 40 MG: 10 INJECTION, SOLUTION INTRAVENOUS at 03:04

## 2025-03-04 RX ADMIN — Medication 400 MG: at 20:18

## 2025-03-04 RX ADMIN — APIXABAN 5 MG: 5 TABLET, FILM COATED ORAL at 20:18

## 2025-03-04 RX ADMIN — ONDANSETRON HYDROCHLORIDE 4 MG: 4 TABLET, FILM COATED ORAL at 22:59

## 2025-03-04 RX ADMIN — FLECAINIDE ACETATE 100 MG: 100 TABLET ORAL at 20:18

## 2025-03-04 SDOH — SOCIAL STABILITY: SOCIAL INSECURITY: DO YOU FEEL ANYONE HAS EXPLOITED OR TAKEN ADVANTAGE OF YOU FINANCIALLY OR OF YOUR PERSONAL PROPERTY?: NO

## 2025-03-04 SDOH — SOCIAL STABILITY: SOCIAL INSECURITY: HAS ANYONE EVER THREATENED TO HURT YOUR FAMILY OR YOUR PETS?: NO

## 2025-03-04 SDOH — ECONOMIC STABILITY: TRANSPORTATION INSECURITY: IN THE PAST 12 MONTHS, HAS LACK OF TRANSPORTATION KEPT YOU FROM MEDICAL APPOINTMENTS OR FROM GETTING MEDICATIONS?: NO

## 2025-03-04 SDOH — SOCIAL STABILITY: SOCIAL INSECURITY: HAVE YOU HAD ANY THOUGHTS OF HARMING ANYONE ELSE?: NO

## 2025-03-04 SDOH — SOCIAL STABILITY: SOCIAL INSECURITY: WERE YOU ABLE TO COMPLETE ALL THE BEHAVIORAL HEALTH SCREENINGS?: YES

## 2025-03-04 SDOH — ECONOMIC STABILITY: FOOD INSECURITY: WITHIN THE PAST 12 MONTHS, YOU WORRIED THAT YOUR FOOD WOULD RUN OUT BEFORE YOU GOT THE MONEY TO BUY MORE.: NEVER TRUE

## 2025-03-04 SDOH — SOCIAL STABILITY: SOCIAL INSECURITY: WITHIN THE LAST YEAR, HAVE YOU BEEN AFRAID OF YOUR PARTNER OR EX-PARTNER?: NO

## 2025-03-04 SDOH — SOCIAL STABILITY: SOCIAL INSECURITY: ARE THERE ANY APPARENT SIGNS OF INJURIES/BEHAVIORS THAT COULD BE RELATED TO ABUSE/NEGLECT?: NO

## 2025-03-04 SDOH — SOCIAL STABILITY: SOCIAL INSECURITY
WITHIN THE LAST YEAR, HAVE YOU BEEN KICKED, HIT, SLAPPED, OR OTHERWISE PHYSICALLY HURT BY YOUR PARTNER OR EX-PARTNER?: NO

## 2025-03-04 SDOH — ECONOMIC STABILITY: HOUSING INSECURITY: IN THE LAST 12 MONTHS, WAS THERE A TIME WHEN YOU WERE NOT ABLE TO PAY THE MORTGAGE OR RENT ON TIME?: NO

## 2025-03-04 SDOH — SOCIAL STABILITY: SOCIAL INSECURITY: HAVE YOU HAD THOUGHTS OF HARMING ANYONE ELSE?: NO

## 2025-03-04 SDOH — SOCIAL STABILITY: SOCIAL INSECURITY: ARE YOU OR HAVE YOU BEEN THREATENED OR ABUSED PHYSICALLY, EMOTIONALLY, OR SEXUALLY BY ANYONE?: NO

## 2025-03-04 SDOH — ECONOMIC STABILITY: FOOD INSECURITY: WITHIN THE PAST 12 MONTHS, THE FOOD YOU BOUGHT JUST DIDN'T LAST AND YOU DIDN'T HAVE MONEY TO GET MORE.: NEVER TRUE

## 2025-03-04 SDOH — ECONOMIC STABILITY: HOUSING INSECURITY: IN THE PAST 12 MONTHS, HOW MANY TIMES HAVE YOU MOVED WHERE YOU WERE LIVING?: 0

## 2025-03-04 SDOH — ECONOMIC STABILITY: FOOD INSECURITY: HOW HARD IS IT FOR YOU TO PAY FOR THE VERY BASICS LIKE FOOD, HOUSING, MEDICAL CARE, AND HEATING?: NOT VERY HARD

## 2025-03-04 SDOH — SOCIAL STABILITY: SOCIAL INSECURITY: ABUSE: ADULT

## 2025-03-04 SDOH — SOCIAL STABILITY: SOCIAL INSECURITY
WITHIN THE LAST YEAR, HAVE YOU BEEN RAPED OR FORCED TO HAVE ANY KIND OF SEXUAL ACTIVITY BY YOUR PARTNER OR EX-PARTNER?: NO

## 2025-03-04 SDOH — SOCIAL STABILITY: SOCIAL INSECURITY: DOES ANYONE TRY TO KEEP YOU FROM HAVING/CONTACTING OTHER FRIENDS OR DOING THINGS OUTSIDE YOUR HOME?: NO

## 2025-03-04 SDOH — SOCIAL STABILITY: SOCIAL INSECURITY: DO YOU FEEL UNSAFE GOING BACK TO THE PLACE WHERE YOU ARE LIVING?: NO

## 2025-03-04 SDOH — ECONOMIC STABILITY: INCOME INSECURITY: IN THE PAST 12 MONTHS HAS THE ELECTRIC, GAS, OIL, OR WATER COMPANY THREATENED TO SHUT OFF SERVICES IN YOUR HOME?: NO

## 2025-03-04 SDOH — SOCIAL STABILITY: SOCIAL INSECURITY: WITHIN THE LAST YEAR, HAVE YOU BEEN HUMILIATED OR EMOTIONALLY ABUSED IN OTHER WAYS BY YOUR PARTNER OR EX-PARTNER?: NO

## 2025-03-04 SDOH — ECONOMIC STABILITY: HOUSING INSECURITY: AT ANY TIME IN THE PAST 12 MONTHS, WERE YOU HOMELESS OR LIVING IN A SHELTER (INCLUDING NOW)?: NO

## 2025-03-04 ASSESSMENT — LIFESTYLE VARIABLES
HAVE YOU OR SOMEONE ELSE BEEN INJURED AS A RESULT OF YOUR DRINKING: NO
HOW OFTEN DURING THE LAST YEAR HAVE YOU NEEDED AN ALCOHOLIC DRINK FIRST THING IN THE MORNING TO GET YOURSELF GOING AFTER A NIGHT OF HEAVY DRINKING: NEVER
HOW OFTEN DURING THE LAST YEAR HAVE YOU FOUND THAT YOU WERE NOT ABLE TO STOP DRINKING ONCE YOU HAD STARTED: NEVER
HAVE YOU OR SOMEONE ELSE BEEN INJURED AS A RESULT OF YOUR DRINKING: NO
HOW OFTEN DO YOU HAVE 6 OR MORE DRINKS ON ONE OCCASION: MONTHLY
SUBSTANCE_ABUSE_PAST_12_MONTHS: NO
SUBSTANCE_ABUSE_PAST_12_MONTHS: NO
HAS A RELATIVE, FRIEND, DOCTOR, OR ANOTHER HEALTH PROFESSIONAL EXPRESSED CONCERN ABOUT YOUR DRINKING OR SUGGESTED YOU CUT DOWN: NO
HOW OFTEN DURING THE LAST YEAR HAVE YOU FAILED TO DO WHAT WAS NORMALLY EXPECTED FROM YOU BECAUSE OF DRINKING: NEVER
AUDIT TOTAL SCORE: 0
HOW OFTEN DURING THE LAST YEAR HAVE YOU BEEN UNABLE TO REMEMBER WHAT HAPPENED THE NIGHT BEFORE BECAUSE YOU HAD BEEN DRINKING: NEVER
HOW OFTEN DURING THE LAST YEAR HAVE YOU NEEDED AN ALCOHOLIC DRINK FIRST THING IN THE MORNING TO GET YOURSELF GOING AFTER A NIGHT OF HEAVY DRINKING: NEVER
AUDIT-C TOTAL SCORE: 6
HOW OFTEN DURING THE LAST YEAR HAVE YOU HAD A FEELING OF GUILT OR REMORSE AFTER DRINKING: NEVER
HOW OFTEN DURING THE LAST YEAR HAVE YOU BEEN UNABLE TO REMEMBER WHAT HAPPENED THE NIGHT BEFORE BECAUSE YOU HAD BEEN DRINKING: NEVER
HOW OFTEN DO YOU HAVE 6 OR MORE DRINKS ON ONE OCCASION: MONTHLY
AUDIT TOTAL SCORE: 6
PRESCIPTION_ABUSE_PAST_12_MONTHS: NO
SKIP TO QUESTIONS 9-10: 0
HOW OFTEN DO YOU HAVE A DRINK CONTAINING ALCOHOL: 2-3 TIMES A WEEK
HOW OFTEN DURING THE LAST YEAR HAVE YOU HAD A FEELING OF GUILT OR REMORSE AFTER DRINKING: NEVER
AUDIT-C TOTAL SCORE: 6
AUDIT-C TOTAL SCORE: 6
HOW OFTEN DURING THE LAST YEAR HAVE YOU FOUND THAT YOU WERE NOT ABLE TO STOP DRINKING ONCE YOU HAD STARTED: NEVER
AUDIT TOTAL SCORE: 6
HAS A RELATIVE, FRIEND, DOCTOR, OR ANOTHER HEALTH PROFESSIONAL EXPRESSED CONCERN ABOUT YOUR DRINKING OR SUGGESTED YOU CUT DOWN: NO
SKIP TO QUESTIONS 9-10: 0
HOW MANY STANDARD DRINKS CONTAINING ALCOHOL DO YOU HAVE ON A TYPICAL DAY: 3 OR 4
HOW MANY STANDARD DRINKS CONTAINING ALCOHOL DO YOU HAVE ON A TYPICAL DAY: 3 OR 4
AUDIT-C TOTAL SCORE: 6
PRESCIPTION_ABUSE_PAST_12_MONTHS: NO
HOW OFTEN DO YOU HAVE A DRINK CONTAINING ALCOHOL: 2-3 TIMES A WEEK
HOW OFTEN DURING THE LAST YEAR HAVE YOU FAILED TO DO WHAT WAS NORMALLY EXPECTED FROM YOU BECAUSE OF DRINKING: NEVER
AUDIT TOTAL SCORE: 0

## 2025-03-04 ASSESSMENT — PAIN SCALES - GENERAL
PAINLEVEL_OUTOF10: 0 - NO PAIN

## 2025-03-04 ASSESSMENT — ACTIVITIES OF DAILY LIVING (ADL)
BATHING: INDEPENDENT
ASSISTIVE_DEVICE: EYEGLASSES
LACK_OF_TRANSPORTATION: NO
HEARING - RIGHT EAR: FUNCTIONAL
GROOMING: INDEPENDENT
ADEQUATE_TO_COMPLETE_ADL: YES
LACK_OF_TRANSPORTATION: NO
TOILETING: INDEPENDENT
HEARING - LEFT EAR: FUNCTIONAL
WALKS IN HOME: INDEPENDENT
DRESSING YOURSELF: INDEPENDENT
JUDGMENT_ADEQUATE_SAFELY_COMPLETE_DAILY_ACTIVITIES: YES
PATIENT'S MEMORY ADEQUATE TO SAFELY COMPLETE DAILY ACTIVITIES?: YES
FEEDING YOURSELF: INDEPENDENT

## 2025-03-04 ASSESSMENT — COGNITIVE AND FUNCTIONAL STATUS - GENERAL
PATIENT BASELINE BEDBOUND: NO
MOBILITY SCORE: 24
DAILY ACTIVITIY SCORE: 24

## 2025-03-04 ASSESSMENT — PATIENT HEALTH QUESTIONNAIRE - PHQ9
1. LITTLE INTEREST OR PLEASURE IN DOING THINGS: NOT AT ALL
1. LITTLE INTEREST OR PLEASURE IN DOING THINGS: NOT AT ALL
SUM OF ALL RESPONSES TO PHQ9 QUESTIONS 1 & 2: 0
2. FEELING DOWN, DEPRESSED OR HOPELESS: NOT AT ALL

## 2025-03-04 ASSESSMENT — PAIN - FUNCTIONAL ASSESSMENT
PAIN_FUNCTIONAL_ASSESSMENT: 0-10
PAIN_FUNCTIONAL_ASSESSMENT: 0-10

## 2025-03-04 NOTE — CARE PLAN
The patient's goals for the shift include  decrease HR    The clinical goals for the shift include maintain NSR  \
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
## 2025-03-04 NOTE — PROGRESS NOTES
"   03/04/25 1421   Discharge Planning   Living Arrangements Children;Spouse/significant other   Support Systems Spouse/significant other;Children   Assistance Needed none   Type of Residence Private residence   Home or Post Acute Services None   Expected Discharge Disposition Home   Does the patient need discharge transport arranged? No   Stroke Family Assessment   Stroke Family Assessment Needed No   Intensity of Service   Intensity of Service 0-30 min     Discharge planning assessment completed with patient. She is from home, independent with mobility and self care needs. Patient works as a health aide. She follows with PCP Jacqueline Mullins. Patient states she has transportation to appointments and errands. Her medications are delivered to her home in a blister pack, and she states she has no concerns about affording her medications. Patient admits to not taking her medications, states she forgets and does not like taking pills. TCC suggested strategies such as setting a phone alarm and taking larger or \"nasty\" pills (as the patient called them) with applesauce or pudding. Patient was receptive, states she will be compliant with her medications when she returns home.   "

## 2025-03-04 NOTE — CARE PLAN
The patient's goals for the shift include  no palpitations, decrease SOB    The clinical goals for the shift include maintain NSR

## 2025-03-04 NOTE — CARE PLAN
The clinical goals for the shift include Patient will remain hemodynamically stable      Problem: Arrythmia/Dysrhythmia  Goal: Lab values return to normal range  Outcome: Progressing  Goal: No evidence of post procedure complications  Outcome: Progressing  Goal: Promote self management  Outcome: Progressing  Goal: Serial ECG will return to baseline  Outcome: Progressing  Goal: Verbalize understanding of procedures/devices  Outcome: Progressing  Goal: Vital signs return to baseline  Outcome: Progressing  Goal: Care and maintenance of device (specify)  Outcome: Progressing

## 2025-03-04 NOTE — CONSULTS
Consults  History Of Present Illness:    Kerry Garcia is a 49 y.o. female with PMHX of prediabets, HTN, HLD, Afibb, h/o prior PE, who is coming to hospital due to maliase, nausea, dizziness and sob.  Patient has history of A-fib in the past but has been in sinus rhythm for quite some time.  She is not taking medication as prescribed.  CT PE was done that didn't show any PE.       Cardiology consulted for palpitation/tachycardia and h/o Afibb    EKG shows NSR with frequent PAC in bigeminal pattern   Telemetry shows patient in NSR currently. Few episode of tachycardia overnight some of which may be atrial tachycardia.     BNP was elevated. Echo has been ordered.     Scr is abnormal at 1.2 on arrival      Last Recorded Vitals:  Vitals:    03/04/25 0232 03/04/25 0713 03/04/25 0851 03/04/25 1211   BP: 118/76 (!) 142/97 104/73 112/73   BP Location: Left arm Left arm Left arm Left arm   Patient Position: Sitting Sitting Lying Lying   Pulse: 62 (!) 164  82   Resp: 20  20 18   Temp: 35.9 °C (96.6 °F)  35.8 °C (96.4 °F) 35.8 °C (96.4 °F)   TempSrc: Temporal  Temporal Temporal   SpO2: 97%  96% 97%   Weight:       Height:           Last Labs:  CBC - 3/4/2025:  5:01 AM  8.2 14.3 234    42.7      CMP - 3/4/2025:  5:01 AM  9.4 7.6 26 --- 0.4   _ 4.3 38 87      PTT - No results in last year.  1.2   13.8 _     Troponin I, High Sensitivity   Date/Time Value Ref Range Status   03/03/2025 08:58 PM <3 0 - 13 ng/L Final     Troponin I   Date/Time Value Ref Range Status   01/28/2023 04:11 PM 95 (H) 0 - 13 ng/L Final     Comment:     .  Less than 99th percentile of normal range cutoff-  Female and children under 18 years old <14 ng/L; Male <21 ng/L: Negative  Repeat testing should be performed if clinically indicated.   .  Female and children under 18 years old 14-50 ng/L; Male 21-50 ng/L:  Consistent with possible cardiac damage and possible increased clinical   risk. Serial measurements may help to assess extent of myocardial damage.    .  >50 ng/L: Consistent with cardiac damage, increased clinical risk and  myocardial infarction. Serial measurements may help assess extent of   myocardial damage.   .   NOTE: Children less than 1 year old may have higher baseline troponin   levels and results should be interpreted in conjunction with the overall   clinical context.   .  NOTE: Troponin I testing is performed using a different   testing methodology at [facility] than at other   system [facility]. Direct result comparisons should only   be made within the same method.  This is a critical result.    Per Laboratory policy, critical results for this test   only qualify to the call list once per 24 hours.      01/28/2023 02:52 PM 74 (HH) 0 - 13 ng/L Final     Comment:     .  Less than 99th percentile of normal range cutoff-  Female and children under 18 years old <14 ng/L; Male <21 ng/L: Negative  Repeat testing should be performed if clinically indicated.   .  Female and children under 18 years old 14-50 ng/L; Male 21-50 ng/L:  Consistent with possible cardiac damage and possible increased clinical   risk. Serial measurements may help to assess extent of myocardial damage.   .  >50 ng/L: Consistent with cardiac damage, increased clinical risk and  myocardial infarction. Serial measurements may help assess extent of   myocardial damage.   .   NOTE: Children less than 1 year old may have higher baseline troponin   levels and results should be interpreted in conjunction with the overall   clinical context.   .  NOTE: Troponin I testing is performed using a different   testing methodology at [facility] than at other   [facility]. Direct result comparisons should only   be made within the same method.   16:05 01/28/2023.    Called- RB to RAJINDER Santana RN, 01/28/2023 16:05     05/31/2022 05:02 PM 5 0 - 13 ng/L Final     Comment:     .  Less than 99th percentile of normal range cutoff-  Female and children under 18 years old <14  ng/L; Male <21 ng/L: Negative  Repeat testing should be performed if clinically indicated.   .  Female and children under 18 years old 14-50 ng/L; Male 21-50 ng/L:  Consistent with possible cardiac damage and possible increased clinical   risk. Serial measurements may help to assess extent of myocardial damage.   .  >50 ng/L: Consistent with cardiac damage, increased clinical risk and  myocardial infarction. Serial measurements may help assess extent of   myocardial damage.   .   NOTE: Children less than 1 year old may have higher baseline troponin   levels and results should be interpreted in conjunction with the overall   clinical context.   .  NOTE: Troponin I testing is performed using a different   testing methodology at [facility] than at other   system hospitals. Direct result comparisons should only   be made within the same method.       BNP   Date/Time Value Ref Range Status   03/03/2025 08:58  (H) 0 - 99 pg/mL Final   05/31/2022 05:03  (H) 0 - 99 pg/mL Final     Comment:     .  <100 pg/mL - Heart failure unlikely  100-299 pg/mL - Intermediate probability of acute heart  .               failure exacerbation. Correlate with clinical  .               context and patient history.    >=300 pg/mL - Heart Failure likely. Correlate with clinical  .               context and patient history.  BNP testing is performed using different testing   methodology at [facility] than at other   [facility] hospitals. Direct result comparisons should   only be made within the same method.     12/20/2021 10:36  (H) 0 - 99 pg/mL Final     Comment:     .  <100 pg/mL - Heart failure unlikely  100-299 pg/mL - Intermediate probability of acute heart  .               failure exacerbation. Correlate with clinical  .               context and patient history.    >=300 pg/mL - Heart Failure likely. Correlate with clinical  .               context and patient history.  BNP testing is performed  using different testing   methodology at [facility] than at other   [facility]. Direct result comparisons should   only be made within the same method.       Hemoglobin A1C   Date/Time Value Ref Range Status   02/22/2024 03:01 PM 5.3 see below % Final   11/15/2022 09:41 AM 5.3 % Final     Comment:          Diagnosis of Diabetes-Adults   Non-Diabetic: < or = 5.6%   Increased risk for developing diabetes: 5.7-6.4%   Diagnostic of diabetes: > or = 6.5%  .       Monitoring of Diabetes                Age (y)     Therapeutic Goal (%)   Adults:          >18           <7.0   Pediatrics:    13-18           <7.5                   7-12           <8.0                   0- 6            7.5-8.5   American Diabetes Association. Diabetes Care 33(S1), Jan 2010.     07/22/2020 03:28 PM 5.3 % Final     Comment:          Diagnosis of Diabetes-Adults   Non-Diabetic: < or = 5.6%   Increased risk for developing diabetes: 5.7-6.4%   Diagnostic of diabetes: > or = 6.5%  .       Monitoring of Diabetes                Age (y)     Therapeutic Goal (%)   Adults:          >18           <7.0   Pediatrics:    13-18           <7.5                   7-12           <8.0                   0- 6            7.5-8.5   American Diabetes Association. Diabetes Care 33(S1), Jan 2010.       LDL Calculated   Date/Time Value Ref Range Status   02/22/2024 03:01  (H) <=99 mg/dL Final     Comment:                                 Near   Borderline      AGE      Desirable  Optimal    High     High     Very High     0-19 Y     0 - 109     ---    110-129   >/= 130     ----    20-24 Y     0 - 119     ---    120-159   >/= 160     ----      >24 Y     0 -  99   100-129  130-159   160-189     >/=190       VLDL   Date/Time Value Ref Range Status   02/22/2024 03:01 PM 38 0 - 40 mg/dL Final   11/15/2022 09:41 AM 42 (H) 0 - 40 mg/dL Final      Last I/O:  No intake/output data recorded.    Past Cardiology Tests (Last 3 Years):  EKG:  ECG 12 Lead  "2024      ECG 12 Lead       ECG 12 Lead 2024    Echo:  No results found for this or any previous visit from the past 1095 days.    Ejection Fractions:  No results found for: \"EF\"  Cath:  No results found for this or any previous visit from the past 1095 days.    Stress Test:  Nuclear Stress Test 2023    Cardiac Imaging:  No results found for this or any previous visit from the past 1095 days.      Past Medical History:  She has a past medical history of Amenorrhea (2023), Body mass index (BMI)40.0-44.9, adult (10/19/2020), Cervicalgia (2022), Chronic cough (2016), Cough (2023), Encounter for follow-up examination after completed treatment for conditions other than malignant neoplasm (2021), Localized edema (10/29/2020), Other conditions influencing health status (2022), Other conditions influencing health status (2021), Pelvic pain (2023), Personal history of other diseases of the nervous system and sense organs, Personal history of other diseases of the respiratory system (2016), Personal history of other endocrine, nutritional and metabolic disease (2021), Personal history of other endocrine, nutritional and metabolic disease (2022), Personal history of other mental and behavioral disorders, Personal history of other specified conditions, and Polyphagia (2023).    Past Surgical History:  She has a past surgical history that includes Cholecystectomy (2013);  section, classic (2013); Other surgical history (2022); and Dental surgery.      Social History:  She reports that she has been smoking cigarettes. She has a 1 pack-year smoking history. She has never used smokeless tobacco. She reports current alcohol use of about 10.0 standard drinks of alcohol per week. She reports that she does not use drugs.    Family History:  Family History   Problem Relation Name Age of Onset    Diabetes Mother Kitty salgado"    Cancer Mother Kitty salgado     Anxiety disorder Mother Kitty salgado     Ovarian cancer Mother Kitty salgado 42    Other (vertigo) Father      Diabetes Father      Neuropathy Father      Other (pad) Father      Other (cardiac disease) Paternal Grandmother          Allergies:  Hydrocodone and Hydrocodone-acetaminophen    Inpatient Medications:  Scheduled medications   Medication Dose Route Frequency    apixaban  5 mg oral BID    atorvastatin  10 mg oral Daily    buPROPion XL  300 mg oral Daily    flecainide  100 mg oral BID    gabapentin  300 mg oral Nightly    metoprolol tartrate  25 mg oral BID     PRN medications   Medication    alum-mag hydroxide-simeth    benzocaine-menthol    dextromethorphan-guaifenesin    guaiFENesin    melatonin    metoprolol    ondansetron    Or    ondansetron    polyethylene glycol     Continuous Medications   Medication Dose Last Rate     Outpatient Medications:  Current Outpatient Medications   Medication Instructions    apixaban (ELIQUIS) 5 mg, oral, 2 times daily    atorvastatin (LIPITOR) 10 mg, oral, Daily    buPROPion XL (WELLBUTRIN XL) 300 mg, oral, Daily, TAKE 1 TABLET BY MOUTH EVERY DAY IN THE MORNING *DO NOT CRUSH, CHEW OR SPLIT*    docusate sodium (COLACE) 100 mg, oral, Daily    flecainide (TAMBOCOR) 100 mg, oral, 2 times daily    gabapentin (NEURONTIN) 300 mg, oral, Nightly, Take at bedtime.    losartan-hydrochlorothiazide (Hyzaar) 50-12.5 mg tablet 1 tablet, oral, Daily    metFORMIN (GLUCOPHAGE) 500 mg, oral, 2 times daily (morning and late afternoon)    metoprolol tartrate (LOPRESSOR) 25 mg, oral, 2 times daily    phentermine-topiramate (Qsymia) 3.75-23 mg capsule, ER multiphase 24 hr 1 capsule, oral, Daily       Physical Exam:  General: Alert and Oriented, No distress, cooperative  Head: Normocephalic without obvious abnormality, atraumatic  Eyes: Conjunctiva/corneas clear, EOM's grossly intact  Neck: Supple, trachea midline, No thyroid enlargement/tenderness/nodules; No  JVD  Lungs: Clear to auscultation bilaterally, no wheezes, rhonci, or rales. respirations unlabored  Chest Wall: No tenderness or deformity  Heart: Regular rhythm, normal S1/S2, no murmur  Abdomen: Soft, non-tender, Non-distended, bowel sounds active  Extremities: No edema, no cyanosis, no clubbing  Skin: Skin color, texture, turgor normal.  No rashes or lesions noted  Neurologic: Alert and oriented x 3, grossly moving all extremities, speech intact       Assessment/Plan   Paroxysymal afibb  Frequent PAC/Tachycardia  HTN  HLD  H/o PE  SOB/Abnormal BNP    Plan:  -Will recommend to check complete blood count, serum electrolytes, and assessment of renal function, TSH and FT4.   -Will recommend to check TTE to evaluate for structural abnormalities.   -Continue eliquis as taking   -Will recommend telemetry monitoring. Currently patient in NSR   -Will continue on flecainide as taking.   -Increase metoprolol to 50 mg bid   -Will suggest to check flu/covid   -workup for nausea/malaise as per primary team     Cardiology will follow      Peripheral IV 03/03/25 20 G Left Antecubital (Active)   Site Assessment Clean;Dry;Intact 03/04/25 0227   Dressing Type Transparent 03/04/25 0227   Line Status Flushed;Saline locked 03/04/25 0227   Dressing Status Clean;Dry 03/04/25 0227   Number of days: 1       Code Status:  Full Code          Mahad Weber MD

## 2025-03-04 NOTE — PROGRESS NOTES
Social work consult placed for positive medical risk screen for alcohol misuse. SW reviewed pt's chart and communicated with TCC. SW  met with pt to assess needs and provide support, introduced self and my role as  with care transition team. Per chart review,pt reports drinking 2-3x/week and 3-4 drinks on a typical day of drinking. Explored pt's thoughts toward drinking behaviors, awareness, and willingness for tx. Pt did not identify any issues/concerns. No other needs identified at this time, SW signing off,  pt and care team aware of SW availability while inpt.    Noa Dickey, MSW, LSW ([id number])   Care Transitions

## 2025-03-04 NOTE — PROGRESS NOTES
Kerry Garcia is a 49 y.o. female admitted for Paroxysmal atrial fibrillation (Multi). Pharmacy reviewed the patient's ansdc-ch-bmfwpirru medications and allergies for accuracy.    The list below reflects the PTA list prior to pharmacy medication history. A summary a changes to the PTA medication list has been listed below. Please review each medication in order reconciliation for additional clarification and justification.    Source of information: t2p     Medications added:    Medications modified:    Medications to be removed:  Qsymia 3.75-23mg     Medications of concern:      Prior to Admission Medications   Prescriptions Last Dose Informant Patient Reported? Taking?   apixaban (Eliquis) 5 mg tablet 3/3/2025  No Yes   Sig: Take 1 tablet (5 mg) by mouth 2 times a day.   atorvastatin (Lipitor) 10 mg tablet   No No   Sig: Take 1 tablet (10 mg) by mouth once daily.   buPROPion XL (Wellbutrin XL) 300 mg 24 hr tablet   No No   Sig: Take 1 tablet (300 mg) by mouth once daily. TAKE 1 TABLET BY MOUTH EVERY DAY IN THE MORNING *DO NOT CRUSH, CHEW OR SPLIT*   docusate sodium (Colace) 100 mg capsule   No No   Sig: TAKE 1 CAPSULE BY MOUTH ONCE DAILY   flecainide (Tambocor) 100 mg tablet   No No   Sig: TAKE 1 TABLET (100 MG) BY MOUTH 2 TIMES A DAY.   gabapentin (Neurontin) 300 mg capsule   No No   Sig: TAKE ONE CAPSULE BY MOUTH EVERY DAY AT BEDTIME   losartan-hydrochlorothiazide (Hyzaar) 50-12.5 mg tablet   No No   Sig: TAKE 1 TABLET BY MOUTH DAILY   metFORMIN (Glucophage) 500 mg tablet   No No   Sig: TAKE 1 TABLET BY MOUTH TWICE DAILY WITH MEALS   metoprolol tartrate (Lopressor) 25 mg tablet   No No   Sig: TAKE 1 TABLET BY MOUTH TWICE DAILY   phentermine-topiramate (Qsymia) 3.75-23 mg capsule, ER multiphase 24 hr   No No   Sig: Take 1 capsule by mouth once daily.      Facility-Administered Medications: None       SARA SHANNON

## 2025-03-04 NOTE — ED PROVIDER NOTES
HPI   Chief Complaint   Patient presents with    Dizziness    Shortness of Breath     Pt states since 2/27 having periods of dizziness nausea SOB with activity  and sometimes fast HR   pt has hx A-fib and has not been constant with taking  Eliquis and Flecainide        Patient presents to the emergency department secondary to concern for malaise, dizziness, nausea, shortness of breath and elevated heart rates.  Patient has a history of atrial fibrillation.  She is currently on flecainide and Eliquis.  She has not always been compliant with her medications.  She states that she is taking them pretty much every day this past week but the week before she has missed doses.  It is because she forgets to take them or she does not wake up in time to take them in the morning.  She does not currently have a cardiologist.  She was seeing Dr. Lauren who has left the facility.  She has not followed up recently.  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                          [place] Coma Scale Score: 15                  Patient History   Past Medical History:   Diagnosis Date    Amenorrhea 05/17/2023    Body mass index (BMI)40.0-44.9, adult 10/19/2020    Body mass index (BMI) of 40.0 to 44.9 in adult    Cervicalgia 05/31/2022    Acute neck pain    Chronic cough 05/09/2016    Persistent cough for 3 weeks or longer    Cough 05/17/2023    Encounter for follow-up examination after completed treatment for conditions other than malignant neoplasm 03/18/2021    Hospital discharge follow-up    Localized edema 10/29/2020    Edema leg    Other conditions influencing health status 04/12/2022    History of cough    Other conditions influencing health status 12/28/2021    History of cough    Pelvic pain 05/17/2023    Personal history of other diseases of the nervous system and sense organs     History of sleep apnea    Personal history of other diseases of the respiratory system 05/09/2016    History of acute bronchitis    Personal  history of other endocrine, nutritional and metabolic disease 2021    History of endocrine disorder    Personal history of other endocrine, nutritional and metabolic disease 2022    History of morbid obesity    Personal history of other mental and behavioral disorders     History of anxiety    Personal history of other specified conditions     History of edema    Polyphagia 2023     Past Surgical History:   Procedure Laterality Date     SECTION, CLASSIC  2013     Section    CHOLECYSTECTOMY  2013    Cholecystectomy Laparoscopic    DENTAL SURGERY      OTHER SURGICAL HISTORY  2022    Ankle surgery     Family History   Problem Relation Name Age of Onset    Diabetes Mother Kitty white     Cancer Mother Kitty salgado     Anxiety disorder Mother Kitty white     Ovarian cancer Mother Kitty salgado 42    Other (vertigo) Father      Diabetes Father      Neuropathy Father      Other (pad) Father      Other (cardiac disease) Paternal Grandmother       Social History     Tobacco Use    Smoking status: Some Days     Current packs/day: 1.00     Average packs/day: 1 pack/day for 1 year (1.0 ttl pk-yrs)     Types: Cigarettes    Smokeless tobacco: Never    Tobacco comments:     Vape-sometimes on the weekends   Vaping Use    Vaping status: Some Days   Substance Use Topics    Alcohol use: Yes     Alcohol/week: 10.0 standard drinks of alcohol     Types: 10 Shots of liquor per week     Comment: sometimes    Drug use: Never       Physical Exam   ED Triage Vitals [25]   Temperature Heart Rate Respirations BP   36.4 °C (97.5 °F) (!) 134 20 (!) 109/91      Pulse Ox Temp Source Heart Rate Source Patient Position   100 % Oral Monitor Sitting      BP Location FiO2 (%)     Left arm --       Physical Exam  Vitals and nursing note reviewed.   Constitutional:       Appearance: Normal appearance. She is well-developed.   HENT:      Head: Normocephalic.      Right Ear: Tympanic membrane normal.       Left Ear: Tympanic membrane normal.      Nose: Nose normal.      Mouth/Throat:      Mouth: Mucous membranes are moist.      Pharynx: Oropharynx is clear.   Eyes:      Extraocular Movements: Extraocular movements intact.      Pupils: Pupils are equal, round, and reactive to light.   Cardiovascular:      Rate and Rhythm: Tachycardia present. Rhythm irregular.      Pulses: Normal pulses.      Heart sounds: Normal heart sounds.   Pulmonary:      Effort: Pulmonary effort is normal.      Breath sounds: Normal breath sounds. No decreased breath sounds, wheezing, rhonchi or rales.   Chest:      Chest wall: No tenderness.   Abdominal:      General: Abdomen is flat. Bowel sounds are normal.      Palpations: Abdomen is soft.   Musculoskeletal:         General: Normal range of motion.      Cervical back: Normal range of motion.      Right lower leg: Edema present.      Left lower leg: Edema present.      Comments: 2+ symmetric lower extremity edema.  It is pitting.   Skin:     General: Skin is warm and dry.      Capillary Refill: Capillary refill takes less than 2 seconds.   Neurological:      General: No focal deficit present.      Mental Status: She is alert and oriented to person, place, and time.   Psychiatric:         Mood and Affect: Mood normal.         Behavior: Behavior normal.       Labs Reviewed   CBC WITH AUTO DIFFERENTIAL - Abnormal       Result Value    WBC 7.6      nRBC 0.0      RBC 5.02      Hemoglobin 15.8      Hematocrit 46.9 (*)     MCV 93      MCH 31.5      MCHC 33.7      RDW 12.6      Platelets 233      Neutrophils % 52.2      Immature Granulocytes %, Automated 0.5      Lymphocytes % 34.0      Monocytes % 10.8      Eosinophils % 1.6      Basophils % 0.9      Neutrophils Absolute 3.94      Immature Granulocytes Absolute, Automated 0.04      Lymphocytes Absolute 2.57      Monocytes Absolute 0.82      Eosinophils Absolute 0.12      Basophils Absolute 0.07     COMPREHENSIVE METABOLIC PANEL - Abnormal     Glucose 90      Sodium 139      Potassium 4.0      Chloride 104      Bicarbonate 28      Anion Gap 11      Urea Nitrogen 22      Creatinine 1.20 (*)     eGFR 55 (*)     Calcium 9.8      Albumin 4.3      Alkaline Phosphatase 87      Total Protein 7.6      AST 26      Bilirubin, Total 0.4      ALT 38     PROTIME-INR - Abnormal    Protime 13.8 (*)     INR 1.2 (*)    B-TYPE NATRIURETIC PEPTIDE - Abnormal     (*)     Narrative:        <100 pg/mL - Heart failure unlikely  100-299 pg/mL - Intermediate probability of acute heart                  failure exacerbation. Correlate with clinical                  context and patient history.    >=300 pg/mL - Heart Failure likely. Correlate with clinical                  context and patient history.    BNP testing is performed using different testing methodology at [facility] than at other [facility]. Direct result comparisons should only be made within the same method.      URINALYSIS WITH REFLEX CULTURE AND MICROSCOPIC - Abnormal    Color, Urine Light-Yellow      Appearance, Urine Clear      Specific Gravity, Urine 1.043 (*)     pH, Urine 6.0      Protein, Urine NEGATIVE      Glucose, Urine Normal      Blood, Urine NEGATIVE      Ketones, Urine NEGATIVE      Bilirubin, Urine NEGATIVE      Urobilinogen, Urine Normal      Nitrite, Urine NEGATIVE      Leukocyte Esterase, Urine NEGATIVE     MAGNESIUM - Normal    Magnesium 1.91     TROPONIN I, HIGH SENSITIVITY - Normal    Troponin I, High Sensitivity <3      Narrative:     Less than 99th percentile of normal range cutoff-  Female and children under 18 years old <14 ng/L; Male <21 ng/L: Negative  Repeat testing should be performed if clinically indicated.     Female and children under 18 years old 14-50 ng/L; Male 21-50 ng/L:  Consistent with possible cardiac damage and possible increased clinical   risk. Serial measurements may help to assess extent of myocardial damage.     >50 ng/L: Consistent with cardiac  damage, increased clinical risk and  myocardial infarction. Serial measurements may help assess extent of   myocardial damage.      NOTE: Children less than 1 year old may have higher baseline troponin   levels and results should be interpreted in conjunction with the overall   clinical context.     NOTE: Troponin I testing is performed using a different   testing methodology at [facility] than at other   [facility]. Direct result comparisons should only   be made within the same method.   URINALYSIS WITH REFLEX CULTURE AND MICROSCOPIC    Narrative:     The following orders were created for panel order Urinalysis with Reflex Culture and Microscopic.  Procedure                               Abnormality         Status                     ---------                               -----------         ------                     Urinalysis with Reflex C...[[id number]]  Abnormal            Final result               Extra Urine Gray Tube[[id number]]                            In process                   Please view results for these tests on the individual orders.   EXTRA URINE GRAY TUBE     Pain Management Panel  More data may exist         Latest Ref Rng & Units 7/15/2021 7/22/2020   Pain Management Panel   Amphetamine Screen, Urine NEGATIVE PRESUMPTIVE NEGATIVE  PRESUMPTIVE NEGATIVE    Barbiturate Screen, Urine NEGATIVE PRESUMPTIVE NEGATIVE  PRESUMPTIVE NEGATIVE    Fentanyl Screen, Urine NEGATIVE PRESUMPTIVE NEGATIVE  -   Methadone Screen, Urine NEGATIVE PRESUMPTIVE NEGATIVE  PRESUMPTIVE NEGATIVE      CT angio chest for pulmonary embolism   Final Result   1.  No evidence of pulmonary emboli to the proximal segmental level.   Evaluation of distal segmental and subsegmental branches   significantly limited by mixing artifact and suboptimal contrast   bolus. Otherwise no acute chest pathology.             Signed by: Wolf Alvarez 3/3/2025 11:29 PM   Dictation workstation:   [id number]      XR chest 1  view   Final Result   No acute cardiopulmonary process.        MACRO:   None        Signed by: Jennie Elise 3/3/2025 10:23 PM   Dictation workstation:   [id number]        ED Course & MDM   Diagnoses as of 03/04/25 0046   Paroxysmal atrial fibrillation (Multi)   Dyspnea, unspecified type       Medical Decision Making  Patient presents to the emergency department secondary to shortness of breath, tachycardia, malaise.  Patient is evaluated in the emergency department for PE, electrolyte abnormality, patient is evaluated using EKG and laboratory workup.  CT angiogram was ordered.  EKG was obtained at 8:29 PM.  It appears to be supraventricular bigeminy though atrial fibrillation or flutter is also possibility.  Heart rate is 107.  No acute ST elevation.  TN interval is 189 and QTc is prolonged at 502.  Laboratory workup shows a negative troponin.  Urinalysis is negative for infection.  BNP is elevated at 300.  Creatinine is elevated at 1.2.  INR is 1.2.  CBC shows no acute anemia or leukocytosis.  CT angiogram shows no central PE.  Somewhat limited distally.  Repeat EKG was obtained at 12:04 AM.  It is sinus rhythm with atrial bigeminy.  Heart rate is 99.  There are nonspecific ST changes similar to previous EKG.  No acute ST elevation.  TN interval is 191 and QTc is 487.  Patient at this time would benefit from hospitalization for cardiac stabilization.  Is discussed with the IMS physician and plan is for admission to the hospital for further cardiac workup.        Procedure  Procedures     Oralia Meyers MD  03/04/25 0046

## 2025-03-04 NOTE — H&P
[facility]    Hospital Medicine History & Physical     Assessment & Plan     ASSESSMENT    49F with hx of Morbid obesity BMI 40, pAF on Eliquis, NIDDM type II, hypertension, and medication nonadherence presents with shortness of breath and heart palpitations.    PLAN    Concern for Afib with RVR  -Presents with URIBE and palpitations  -On adm, 's, EKG electronic read sinus tach but suspect this is afib  -Continue home flecainide, metoprolol, and Eliquis  -IV metoprolol 5 mg as needed for HR greater than 120  -Echocardiogram  -Telemetry  -Cardiology consultation    URIBE  -Suspect due to the above  - and does have edema but no evidence of hypervolemia on imaging  -Is supposed to be on diuretics although cannot handle due to incontinence  -Obtain echocardiogram and viral respiratory panel  -Lasix 40 mg IV x 1    Other Issues  -Morbid obesity BMI 40: Complicates all aspects of care  -Medication nonadherence: Recommended strict adherence to meds  -Essential hypertension: Home medications  -NIDDM type II: MDSS    DVT Prophy  -Home Eliquis    Disposition  -Med Surg      Prabhjot Richard MD    History of Present Illness     Kerry Garcia is a 49 y.o. with hx of Morbid obesity BMI 40, pAF on Eliquis, NIDDM type II, hypertension, and medication nonadherence presents with shortness of breath and heart palpitations.  Patient was in her normal state of health until a few days ago when she started experiencing shortness of breath mainly with exertion.  Says that she noticed it when she was trying to walk upstairs.  Also with heart palpitations but no actual chest pain.  Has lower extremity edema but this is chronic for her.  Is supposed to be on a water pill but cannot handle them due to urinary incontinence.  History of intermittent compliance with medications although says she has been taking her medications regularly in the past week.  In the ED, HR 120s.  EKG electronic read  sinus tach but irregular and no P waves concerned this was actually A-fib and given IV diltiazem.  CXR negative.  CT of the chest negative.  Rates with better control.  Admitted to medicine.    Review of Systems     A Comprehensive greater than 10 system review of systems was carried out.  Pertinent positives and negatives are noted above.  Otherwise negative for contributory information.     Past Medical History     Past Medical History:   Diagnosis Date    Amenorrhea 05/17/2023    Body mass index (BMI)40.0-44.9, adult 10/19/2020    Body mass index (BMI) of 40.0 to 44.9 in adult    Cervicalgia 05/31/2022    Acute neck pain    Chronic cough 05/09/2016    Persistent cough for 3 weeks or longer    Cough 05/17/2023    Encounter for follow-up examination after completed treatment for conditions other than malignant neoplasm 03/18/2021    Hospital discharge follow-up    Localized edema 10/29/2020    Edema leg    Other conditions influencing health status 04/12/2022    History of cough    Other conditions influencing health status 12/28/2021    History of cough    Pelvic pain 05/17/2023    Personal history of other diseases of the nervous system and sense organs     History of sleep apnea    Personal history of other diseases of the respiratory system 05/09/2016    History of acute bronchitis    Personal history of other endocrine, nutritional and metabolic disease 08/06/2021    History of endocrine disorder    Personal history of other endocrine, nutritional and metabolic disease 05/17/2022    History of morbid obesity    Personal history of other mental and behavioral disorders     History of anxiety    Personal history of other specified conditions     History of edema    Polyphagia 05/17/2023     Medications     No current facility-administered medications on file prior to encounter.     Current Outpatient Medications on File Prior to Encounter   Medication Sig Dispense Refill    apixaban (Eliquis) 5 mg tablet Take 1  tablet (5 mg) by mouth 2 times a day. 180 tablet 3    atorvastatin (Lipitor) 10 mg tablet Take 1 tablet (10 mg) by mouth once daily. 90 tablet 3    buPROPion XL (Wellbutrin XL) 300 mg 24 hr tablet Take 1 tablet (300 mg) by mouth once daily. TAKE 1 TABLET BY MOUTH EVERY DAY IN THE MORNING *DO NOT CRUSH, CHEW OR SPLIT* 90 tablet 3    docusate sodium (Colace) 100 mg capsule TAKE 1 CAPSULE BY MOUTH ONCE DAILY 30 capsule 10    flecainide (Tambocor) 100 mg tablet TAKE 1 TABLET (100 MG) BY MOUTH 2 TIMES A DAY. 180 tablet 1    gabapentin (Neurontin) 300 mg capsule TAKE ONE CAPSULE BY MOUTH EVERY DAY AT BEDTIME 30 capsule 10    losartan-hydrochlorothiazide (Hyzaar) 50-12.5 mg tablet TAKE 1 TABLET BY MOUTH DAILY 30 tablet 10    metFORMIN (Glucophage) 500 mg tablet TAKE 1 TABLET BY MOUTH TWICE DAILY WITH MEALS 60 tablet 10    metoprolol tartrate (Lopressor) 25 mg tablet TAKE 1 TABLET BY MOUTH TWICE DAILY 60 tablet 10    phentermine-topiramate (Qsymia) 3.75-23 mg capsule, ER multiphase 24 hr Take 1 capsule by mouth once daily. 30 capsule 0      Past Surgical History     Past Surgical History:   Procedure Laterality Date     SECTION, CLASSIC  2013     Section    CHOLECYSTECTOMY  2013    Cholecystectomy Laparoscopic    DENTAL SURGERY      OTHER SURGICAL HISTORY  2022    Ankle surgery     Family History   Reviewed and non-contributory to presenting complaints    Allergies     Allergies   Allergen Reactions    Hydrocodone Unknown    Hydrocodone-Acetaminophen Nausea And Vomiting and Unknown     vicoden     Social History     Social History     Tobacco Use    Smoking status: Some Days     Current packs/day: 1.00     Average packs/day: 1 pack/day for 1 year (1.0 ttl pk-yrs)     Types: Cigarettes    Smokeless tobacco: Never    Tobacco comments:     Vape-sometimes on the weekends   Substance Use Topics    Alcohol use: Yes     Alcohol/week: 10.0 standard drinks of alcohol     Types: 10 Shots of liquor  "per week     Comment: sometimes     Physical Exam   Blood pressure 106/84, pulse 93, temperature 36.4 °C (97.5 °F), temperature source Oral, resp. rate 20, height 1.626 m (5' 4\"), weight 127 kg (280 lb), SpO2 97%.    General: Ill appearing, cooperative with exam, in NAD.  HEENT: Atraumatic. No erythema in posterior pharynx.  Lymph: No cervical or inguinal lymphadenopathy.  Cardiac: Tachycardic and irregular. No murmurs.  Lungs: CTAB. Nl WOB.  Abd: Non-tender. No rebound or gaurding. Nl bowel sounds.  Ext: 2+ pitting edema. 2+ pulses.  Skin: No rashes, abrasions, or contusions.  Psych: A&Ox3. Nl affect.  Neuro: 5/5 strength. Sensation intact.    Labs & Imaging     Reviewed and Pertinent results discussed in assessment and plan.      "

## 2025-03-05 ENCOUNTER — APPOINTMENT (OUTPATIENT)
Dept: CARDIOLOGY | Facility: HOSPITAL | Age: 50
End: 2025-03-05
Payer: MEDICAID

## 2025-03-05 PROBLEM — I48.91 A-FIB (MULTI): Status: ACTIVE | Noted: 2025-03-05

## 2025-03-05 LAB
ANION GAP SERPL CALC-SCNC: 13 MMOL/L (ref 10–20)
BUN SERPL-MCNC: 20 MG/DL (ref 6–23)
CALCIUM SERPL-MCNC: 8.9 MG/DL (ref 8.6–10.3)
CHLORIDE SERPL-SCNC: 100 MMOL/L (ref 98–107)
CO2 SERPL-SCNC: 30 MMOL/L (ref 21–32)
CREAT SERPL-MCNC: 1.1 MG/DL (ref 0.5–1.05)
EGFRCR SERPLBLD CKD-EPI 2021: 61 ML/MIN/1.73M*2
EJECTION FRACTION APICAL 4 CHAMBER: 38
EJECTION FRACTION: 43 %
GLUCOSE SERPL-MCNC: 105 MG/DL (ref 74–99)
LACTATE SERPL-SCNC: 1.1 MMOL/L (ref 0.4–2)
LEFT ATRIUM VOLUME AREA LENGTH INDEX BSA: 16.8 ML/M2
LEFT VENTRICLE INTERNAL DIMENSION DIASTOLE: 5.3 CM (ref 3.5–6)
LEFT VENTRICULAR OUTFLOW TRACT DIAMETER: 2 CM
LV EJECTION FRACTION BIPLANE: 39 %
MAGNESIUM SERPL-MCNC: 1.82 MG/DL (ref 1.6–2.4)
MITRAL VALVE E/A RATIO: 1.28
POTASSIUM SERPL-SCNC: 4.2 MMOL/L (ref 3.5–5.3)
RIGHT VENTRICLE FREE WALL PEAK S': 11.9 CM/S
RIGHT VENTRICLE PEAK SYSTOLIC PRESSURE: 15.8 MMHG
SODIUM SERPL-SCNC: 139 MMOL/L (ref 136–145)
TRICUSPID ANNULAR PLANE SYSTOLIC EXCURSION: 1.8 CM

## 2025-03-05 PROCEDURE — 36415 COLL VENOUS BLD VENIPUNCTURE: CPT | Performed by: HOSPITALIST

## 2025-03-05 PROCEDURE — 99232 SBSQ HOSP IP/OBS MODERATE 35: CPT | Performed by: PHYSICIAN ASSISTANT

## 2025-03-05 PROCEDURE — 93306 TTE W/DOPPLER COMPLETE: CPT | Performed by: INTERNAL MEDICINE

## 2025-03-05 PROCEDURE — 80048 BASIC METABOLIC PNL TOTAL CA: CPT | Performed by: HOSPITALIST

## 2025-03-05 PROCEDURE — 96375 TX/PRO/DX INJ NEW DRUG ADDON: CPT | Mod: 59

## 2025-03-05 PROCEDURE — 2500000001 HC RX 250 WO HCPCS SELF ADMINISTERED DRUGS (ALT 637 FOR MEDICARE OP): Performed by: HOSPITALIST

## 2025-03-05 PROCEDURE — 2500000004 HC RX 250 GENERAL PHARMACY W/ HCPCS (ALT 636 FOR OP/ED): Performed by: INTERNAL MEDICINE

## 2025-03-05 PROCEDURE — G0378 HOSPITAL OBSERVATION PER HR: HCPCS

## 2025-03-05 PROCEDURE — 83735 ASSAY OF MAGNESIUM: CPT | Performed by: HOSPITALIST

## 2025-03-05 PROCEDURE — C8929 TTE W OR WO FOL WCON,DOPPLER: HCPCS

## 2025-03-05 PROCEDURE — 99232 SBSQ HOSP IP/OBS MODERATE 35: CPT | Performed by: HOSPITALIST

## 2025-03-05 PROCEDURE — 82374 ASSAY BLOOD CARBON DIOXIDE: CPT | Performed by: HOSPITALIST

## 2025-03-05 PROCEDURE — 96376 TX/PRO/DX INJ SAME DRUG ADON: CPT | Mod: 59

## 2025-03-05 PROCEDURE — 83605 ASSAY OF LACTIC ACID: CPT | Performed by: HOSPITALIST

## 2025-03-05 PROCEDURE — 2500000001 HC RX 250 WO HCPCS SELF ADMINISTERED DRUGS (ALT 637 FOR MEDICARE OP): Performed by: PHYSICIAN ASSISTANT

## 2025-03-05 PROCEDURE — 2500000004 HC RX 250 GENERAL PHARMACY W/ HCPCS (ALT 636 FOR OP/ED): Performed by: HOSPITALIST

## 2025-03-05 PROCEDURE — 2500000001 HC RX 250 WO HCPCS SELF ADMINISTERED DRUGS (ALT 637 FOR MEDICARE OP): Performed by: INTERNAL MEDICINE

## 2025-03-05 RX ORDER — LISINOPRIL 5 MG/1
5 TABLET ORAL DAILY
Status: DISCONTINUED | OUTPATIENT
Start: 2025-03-05 | End: 2025-03-06

## 2025-03-05 RX ORDER — GABAPENTIN 300 MG/1
300 CAPSULE ORAL ONCE
Status: COMPLETED | OUTPATIENT
Start: 2025-03-05 | End: 2025-03-05

## 2025-03-05 RX ADMIN — BUPROPION HYDROCHLORIDE 300 MG: 150 TABLET, EXTENDED RELEASE ORAL at 08:42

## 2025-03-05 RX ADMIN — GABAPENTIN 300 MG: 300 CAPSULE ORAL at 22:00

## 2025-03-05 RX ADMIN — Medication 400 MG: at 08:42

## 2025-03-05 RX ADMIN — HUMAN ALBUMIN MICROSPHERES AND PERFLUTREN 0.5 ML: 10; .22 INJECTION, SOLUTION INTRAVENOUS at 13:17

## 2025-03-05 RX ADMIN — LISINOPRIL 5 MG: 5 TABLET ORAL at 17:08

## 2025-03-05 RX ADMIN — METOPROLOL TARTRATE 50 MG: 50 TABLET, FILM COATED ORAL at 08:42

## 2025-03-05 RX ADMIN — METOPROLOL TARTRATE 50 MG: 50 TABLET, FILM COATED ORAL at 21:20

## 2025-03-05 RX ADMIN — APIXABAN 5 MG: 5 TABLET, FILM COATED ORAL at 21:20

## 2025-03-05 RX ADMIN — APIXABAN 5 MG: 5 TABLET, FILM COATED ORAL at 08:42

## 2025-03-05 RX ADMIN — ATORVASTATIN CALCIUM 10 MG: 10 TABLET, FILM COATED ORAL at 08:42

## 2025-03-05 RX ADMIN — ONDANSETRON 4 MG: 2 INJECTION INTRAMUSCULAR; INTRAVENOUS at 21:25

## 2025-03-05 RX ADMIN — FLECAINIDE ACETATE 100 MG: 100 TABLET ORAL at 08:42

## 2025-03-05 RX ADMIN — ONDANSETRON 4 MG: 2 INJECTION INTRAMUSCULAR; INTRAVENOUS at 12:31

## 2025-03-05 RX ADMIN — GABAPENTIN 300 MG: 300 CAPSULE ORAL at 21:20

## 2025-03-05 RX ADMIN — EMPAGLIFLOZIN 10 MG: 10 TABLET, FILM COATED ORAL at 17:08

## 2025-03-05 ASSESSMENT — ENCOUNTER SYMPTOMS
DYSURIA: 0
PALPITATIONS: 0
DIARRHEA: 0
SHORTNESS OF BREATH: 0
WEAKNESS: 0
ORTHOPNEA: 0
NAUSEA: 0
VOMITING: 0
FEVER: 0
ABDOMINAL PAIN: 0
WHEEZING: 0

## 2025-03-05 ASSESSMENT — PAIN SCALES - GENERAL
PAINLEVEL_OUTOF10: 0 - NO PAIN
PAINLEVEL_OUTOF10: 0 - NO PAIN
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
## 2025-03-05 NOTE — CARE PLAN
The clinical goals for the shift include Patient will remain free of injury        Problem: Safety - Adult  Goal: Free from fall injury  Outcome: Progressing     Problem: Discharge Planning  Goal: Discharge to home or other facility with appropriate resources  Outcome: Progressing

## 2025-03-05 NOTE — CARE PLAN
The patient's goals for the shift include stay informed.      The clinical goals for the shift include Patient will remain HDS throughout the shift.    Over the shift, the patient did make progress toward the following goals. Barriers to progression include understanding. Recommendations to address these barriers include education.

## 2025-03-05 NOTE — PROGRESS NOTES
PROGRESS NOTE    HPI:  Kerry Garcia is a 49 y.o. female with PMHX of prediabets, HTN, HLD, Afibb, h/o prior PE, who is coming to hospital due to maliase, nausea, dizziness and sob.  Patient has history of A-fib in the past but has been in sinus rhythm for quite some time.  She is not taking medication as prescribed.  CT PE was done that didn't show any PE.         Cardiology consulted for palpitation/tachycardia and h/o Afibb     EKG shows NSR with frequent PAC in bigeminal pattern   Telemetry shows patient in NSR currently. Few episode of tachycardia overnight some of which may be atrial tachycardia.      BNP was elevated. Echo has been ordered.      Scr is abnormal at 1.2 on arrival        Subjective Data:  Patient resting comfortably.  She states that she had meds for nausea last evening and slept very well but when she got up to the restroom this morning she had a diffuse pain to her left shoulder area with no other symptoms.  No reported palpitations or trouble breathing and CT scan yesterday was negative for PE.  Telemetry shows sinus rhythm with PACs.  Will update echocardiogram today.    Overnight Events:    None     Objective Data:  Last Recorded Vitals:  Vitals:    03/04/25 1932 03/04/25 2300 03/05/25 0300 03/05/25 0811   BP: 113/78 106/69 96/63 124/59   BP Location: Right arm Right arm Right arm Right arm   Patient Position: Lying Lying Lying Lying   Pulse: 75 72 72 71   Resp: 20 20 18 19   Temp: 36.9 °C (98.4 °F) 36 °C (96.8 °F) 36.2 °C (97.2 °F) 36.6 °C (97.9 °F)   TempSrc: Temporal Temporal Temporal Temporal   SpO2: 97% 97% 93% 96%   Weight:   128 kg (282 lb 13.6 oz)    Height:           Last Labs:  CBC - 3/4/2025:  5:01 AM  8.2 14.3 234    42.7      CMP - 3/5/2025:  4:52 AM  8.9 7.6 26 --- 0.4   _ 4.3 38 87      PTT - No results in last year.  1.2   13.8 _     Last I/O:  No intake/output data recorded.    Past Cardiology Tests (Last 3 Years):  Echo: Pending today      Stress Test:  IMPRESSION:  1.  Normal stress myocardial perfusion imaging in response to  pharmacologic stress.  2. Well-maintained left ventricular function.      Inpatient Medications:  Scheduled medications   Medication Dose Route Frequency    apixaban  5 mg oral BID    atorvastatin  10 mg oral Daily    buPROPion XL  300 mg oral Daily    flecainide  100 mg oral BID    gabapentin  300 mg oral Nightly    metoprolol tartrate  50 mg oral BID       Review of Systems   Constitutional: Positive for malaise/fatigue. Negative for fever.   Cardiovascular:  Negative for chest pain, orthopnea and palpitations.   Respiratory:  Negative for shortness of breath and wheezing.    Skin:  Negative for itching and rash.   Musculoskeletal:         Had episode of left shoulder discomfort this morning.   Gastrointestinal:  Negative for abdominal pain, diarrhea, nausea and vomiting.   Genitourinary:  Negative for dysuria.   Neurological:  Negative for weakness.        Physical Exam  Constitutional:       General: She is not in acute distress.     Appearance: Normal appearance. She is obese.   HENT:      Mouth/Throat:      Mouth: Mucous membranes are moist.   Neck:      Comments: Flat neck veins  Cardiovascular:      Rate and Rhythm: Normal rate and regular rhythm.      Heart sounds: Normal heart sounds.      Comments: Telemetry is sinus rhythm with PACs  Pulmonary:      Effort: Pulmonary effort is normal.      Breath sounds: Normal breath sounds.   Abdominal:      General: Bowel sounds are normal.      Palpations: Abdomen is soft.      Tenderness: There is no abdominal tenderness.   Musculoskeletal:      Right lower leg: No edema.      Left lower leg: No edema.      Comments: States that edema that she had is much improved   Skin:     General: Skin is warm and dry.   Neurological:      Mental Status: She is alert and oriented to person, place, and time.   Psychiatric:         Behavior: Behavior is cooperative.         ASSESSMENT/PLAN  Paroxysymal afibb  Frequent  PAC/Tachycardia  HTN  HLD  H/o PE  SOB/Abnormal BNP     Plan:  -Will recommend to check complete blood count, serum electrolytes, and assessment of renal function, TSH and FT4.   -Will recommend to check TTE to evaluate for structural abnormalities.   -Continue eliquis as taking   -Will recommend telemetry monitoring. Currently patient in NSR   -Will continue on flecainide as taking.   -Increase metoprolol to 50 mg bid   -Will suggest to check flu/covid   -workup for nausea/malaise as per primary team   3-5-25: Patient did require further meds for nausea last evening but nausea went away and she slept well.  Upon getting up this morning she had some diffuse aching to her left shoulder area which quickly went away with no other associated symptoms.  Update echocardiogram today.  Basic labs are unremarkable the but Dr. Weber did recommend thyroid studies.  She remains in sinus rhythm we will continue beta-blockers, flecainide and her Eliquis.  COVID and influenza markers were negative.  If echocardiogram is unremarkable no further cardiac workup will be necessary.    Addendum= echo results reviewed showing downturn in EF to 40 to 45% with generalized LV dysfunction.  She also has some RV involvement.  Recent testing in 2023 showed normal LV systolic function and stress test showed no evidence of ischemia.  Reviewed with Dr. Weber and we will discontinue flecainide which is contraindicated in LV dysfunction and we will add Jardiance and low-dose ACE inhibitor and monitor blood pressures.  We will maximize guideline directed medical therapy and reevaluate LV systolic function.      Carl Hernandes PA-C  3/5/2025  9:09 AM

## 2025-03-06 ENCOUNTER — APPOINTMENT (OUTPATIENT)
Dept: RADIOLOGY | Facility: HOSPITAL | Age: 50
End: 2025-03-06
Payer: MEDICAID

## 2025-03-06 LAB
ANION GAP SERPL CALC-SCNC: 14 MMOL/L (ref 10–20)
BUN SERPL-MCNC: 33 MG/DL (ref 6–23)
CALCIUM SERPL-MCNC: 8.8 MG/DL (ref 8.6–10.3)
CHLORIDE SERPL-SCNC: 102 MMOL/L (ref 98–107)
CO2 SERPL-SCNC: 25 MMOL/L (ref 21–32)
CREAT SERPL-MCNC: 1.43 MG/DL (ref 0.5–1.05)
EGFRCR SERPLBLD CKD-EPI 2021: 44 ML/MIN/1.73M*2
ERYTHROCYTE [DISTWIDTH] IN BLOOD BY AUTOMATED COUNT: 12.7 % (ref 11.5–14.5)
GLUCOSE SERPL-MCNC: 100 MG/DL (ref 74–99)
HCT VFR BLD AUTO: 40.2 % (ref 36–46)
HGB BLD-MCNC: 13.7 G/DL (ref 12–16)
LIPASE SERPL-CCNC: 25 U/L (ref 9–82)
MCH RBC QN AUTO: 32.1 PG (ref 26–34)
MCHC RBC AUTO-ENTMCNC: 34.1 G/DL (ref 32–36)
MCV RBC AUTO: 94 FL (ref 80–100)
NRBC BLD-RTO: 0 /100 WBCS (ref 0–0)
PLATELET # BLD AUTO: 223 X10*3/UL (ref 150–450)
POTASSIUM SERPL-SCNC: 3.8 MMOL/L (ref 3.5–5.3)
RBC # BLD AUTO: 4.27 X10*6/UL (ref 4–5.2)
SODIUM SERPL-SCNC: 137 MMOL/L (ref 136–145)
WBC # BLD AUTO: 7.1 X10*3/UL (ref 4.4–11.3)

## 2025-03-06 PROCEDURE — G0378 HOSPITAL OBSERVATION PER HR: HCPCS

## 2025-03-06 PROCEDURE — 83690 ASSAY OF LIPASE: CPT | Performed by: HOSPITALIST

## 2025-03-06 PROCEDURE — 2500000001 HC RX 250 WO HCPCS SELF ADMINISTERED DRUGS (ALT 637 FOR MEDICARE OP): Performed by: REGISTERED NURSE

## 2025-03-06 PROCEDURE — 2500000001 HC RX 250 WO HCPCS SELF ADMINISTERED DRUGS (ALT 637 FOR MEDICARE OP): Performed by: NURSE PRACTITIONER

## 2025-03-06 PROCEDURE — 80048 BASIC METABOLIC PNL TOTAL CA: CPT | Performed by: HOSPITALIST

## 2025-03-06 PROCEDURE — 2500000004 HC RX 250 GENERAL PHARMACY W/ HCPCS (ALT 636 FOR OP/ED): Performed by: HOSPITALIST

## 2025-03-06 PROCEDURE — 2500000001 HC RX 250 WO HCPCS SELF ADMINISTERED DRUGS (ALT 637 FOR MEDICARE OP): Performed by: INTERNAL MEDICINE

## 2025-03-06 PROCEDURE — 2500000002 HC RX 250 W HCPCS SELF ADMINISTERED DRUGS (ALT 637 FOR MEDICARE OP, ALT 636 FOR OP/ED): Performed by: NURSE PRACTITIONER

## 2025-03-06 PROCEDURE — 99254 IP/OBS CNSLTJ NEW/EST MOD 60: CPT | Performed by: NURSE PRACTITIONER

## 2025-03-06 PROCEDURE — 36415 COLL VENOUS BLD VENIPUNCTURE: CPT | Performed by: HOSPITALIST

## 2025-03-06 PROCEDURE — 99232 SBSQ HOSP IP/OBS MODERATE 35: CPT | Performed by: NURSE PRACTITIONER

## 2025-03-06 PROCEDURE — 2500000001 HC RX 250 WO HCPCS SELF ADMINISTERED DRUGS (ALT 637 FOR MEDICARE OP): Performed by: PHYSICIAN ASSISTANT

## 2025-03-06 PROCEDURE — 96361 HYDRATE IV INFUSION ADD-ON: CPT

## 2025-03-06 PROCEDURE — 96374 THER/PROPH/DIAG INJ IV PUSH: CPT | Mod: 59

## 2025-03-06 PROCEDURE — 85027 COMPLETE CBC AUTOMATED: CPT | Performed by: HOSPITALIST

## 2025-03-06 PROCEDURE — 74176 CT ABD & PELVIS W/O CONTRAST: CPT

## 2025-03-06 PROCEDURE — 2500000004 HC RX 250 GENERAL PHARMACY W/ HCPCS (ALT 636 FOR OP/ED): Performed by: REGISTERED NURSE

## 2025-03-06 PROCEDURE — 99232 SBSQ HOSP IP/OBS MODERATE 35: CPT | Performed by: HOSPITALIST

## 2025-03-06 RX ORDER — LISINOPRIL 2.5 MG/1
2.5 TABLET ORAL DAILY
Status: DISCONTINUED | OUTPATIENT
Start: 2025-03-07 | End: 2025-03-07

## 2025-03-06 RX ORDER — AMIODARONE HYDROCHLORIDE 200 MG/1
200 TABLET ORAL DAILY
Status: DISCONTINUED | OUTPATIENT
Start: 2025-03-19 | End: 2025-03-07 | Stop reason: HOSPADM

## 2025-03-06 RX ORDER — AMIODARONE HYDROCHLORIDE 200 MG/1
400 TABLET ORAL 2 TIMES DAILY
Status: DISCONTINUED | OUTPATIENT
Start: 2025-03-06 | End: 2025-03-07 | Stop reason: HOSPADM

## 2025-03-06 RX ORDER — SODIUM CHLORIDE 9 MG/ML
42 INJECTION, SOLUTION INTRAVENOUS CONTINUOUS
Status: ACTIVE | OUTPATIENT
Start: 2025-03-06 | End: 2025-03-07

## 2025-03-06 RX ORDER — MIDODRINE HYDROCHLORIDE 10 MG/1
10 TABLET ORAL ONCE
Status: COMPLETED | OUTPATIENT
Start: 2025-03-06 | End: 2025-03-06

## 2025-03-06 RX ADMIN — EMPAGLIFLOZIN 10 MG: 10 TABLET, FILM COATED ORAL at 09:10

## 2025-03-06 RX ADMIN — PROMETHAZINE HYDROCHLORIDE 6.25 MG: 25 INJECTION INTRAMUSCULAR; INTRAVENOUS at 18:02

## 2025-03-06 RX ADMIN — MIDODRINE HYDROCHLORIDE 10 MG: 10 TABLET ORAL at 23:17

## 2025-03-06 RX ADMIN — GABAPENTIN 300 MG: 300 CAPSULE ORAL at 21:14

## 2025-03-06 RX ADMIN — APIXABAN 5 MG: 5 TABLET, FILM COATED ORAL at 21:14

## 2025-03-06 RX ADMIN — SODIUM CHLORIDE 42 ML/HR: 9 INJECTION, SOLUTION INTRAVENOUS at 13:24

## 2025-03-06 RX ADMIN — ATORVASTATIN CALCIUM 10 MG: 10 TABLET, FILM COATED ORAL at 09:10

## 2025-03-06 RX ADMIN — AMIODARONE HYDROCHLORIDE 400 MG: 200 TABLET ORAL at 23:17

## 2025-03-06 RX ADMIN — METOPROLOL SUCCINATE 75 MG: 50 TABLET, EXTENDED RELEASE ORAL at 09:10

## 2025-03-06 RX ADMIN — BUPROPION HYDROCHLORIDE 300 MG: 150 TABLET, EXTENDED RELEASE ORAL at 09:10

## 2025-03-06 RX ADMIN — ALUMINUM HYDROXIDE, MAGNESIUM HYDROXIDE, AND DIMETHICONE 30 ML: 200; 20; 200 SUSPENSION ORAL at 09:14

## 2025-03-06 RX ADMIN — LISINOPRIL 5 MG: 5 TABLET ORAL at 09:10

## 2025-03-06 RX ADMIN — APIXABAN 5 MG: 5 TABLET, FILM COATED ORAL at 09:10

## 2025-03-06 RX ADMIN — SODIUM CHLORIDE, POTASSIUM CHLORIDE, SODIUM LACTATE AND CALCIUM CHLORIDE 500 ML: 600; 310; 30; 20 INJECTION, SOLUTION INTRAVENOUS at 21:17

## 2025-03-06 ASSESSMENT — ENCOUNTER SYMPTOMS
DIARRHEA: 0
SYNCOPE: 0
NAUSEA: 1
DYSURIA: 0
SHORTNESS OF BREATH: 0
FEVER: 0
ABDOMINAL PAIN: 1
DIZZINESS: 0
LIGHT-HEADEDNESS: 1
ORTHOPNEA: 0
VOMITING: 1
WEAKNESS: 0
WHEEZING: 0
PALPITATIONS: 0

## 2025-03-06 ASSESSMENT — COGNITIVE AND FUNCTIONAL STATUS - GENERAL
MOBILITY SCORE: 24
DAILY ACTIVITIY SCORE: 24

## 2025-03-06 ASSESSMENT — PAIN - FUNCTIONAL ASSESSMENT: PAIN_FUNCTIONAL_ASSESSMENT: 0-10

## 2025-03-06 ASSESSMENT — PAIN SCALES - GENERAL
PAINLEVEL_OUTOF10: 0 - NO PAIN
PAINLEVEL_OUTOF10: 0 - NO PAIN

## 2025-03-06 NOTE — PROGRESS NOTES
PROGRESS NOTE    HPI:  Kerry Garcia is a 49 y.o. female with PMHX of prediabets, HTN, HLD, Afibb, h/o prior PE, who is coming to hospital due to maliase, nausea, dizziness and sob.  Patient has history of A-fib in the past but has been in sinus rhythm for quite some time.  She is not taking medication as prescribed.  CT PE was done that didn't show any PE.         Cardiology consulted for palpitation/tachycardia and h/o Afibb     EKG shows NSR with frequent PAC in bigeminal pattern   Telemetry shows patient in NSR currently. Few episode of tachycardia overnight some of which may be atrial tachycardia.      BNP was elevated. Echo has been ordered.      Scr is abnormal at 1.2 on arrival        Subjective Data:  Patient resting comfortably.  She states that she had meds for nausea last evening and slept very well but when she got up to the restroom this morning she had a diffuse pain to her left shoulder area with no other symptoms.  No reported palpitations or trouble breathing and CT scan yesterday was negative for PE.  Telemetry shows sinus rhythm with PACs.  Will update echocardiogram today.  3/6/25: Reports ongoing abd pain and nausea. She will vomit if she tried to eat anything, but feels hungry.  She states she has tried to eat a quesadilla and omelet.  Educated to try bland foods such as toast, banana, applesauce and rice.  She states she has been able to drink liquids.  +palpitations.  No chest pain or SOB.  She is frustrated and tearful during encounter. SR with PACs on telemetry, HR 76 bpm.    Overnight Events:    None     Objective Data:  Last Recorded Vitals:  Vitals:    03/05/25 2023 03/06/25 0006 03/06/25 0406 03/06/25 0700   BP:  94/64 97/58 111/77   BP Location:   Left leg Right arm   Patient Position:   Lying Sitting   Pulse: (!) 138 102 76 76   Resp: 18 18 18 16   Temp: 35.9 °C (96.6 °F) 36.8 °C (98.2 °F) 36.3 °C (97.4 °F) 36.1 °C (97 °F)   TempSrc:   Temporal Temporal   SpO2: 95% 96% 96% 94%    Weight:   129 kg (284 lb 9.8 oz)    Height:           Last Labs:  Results from last 7 days   Lab Units 03/06/25  0433 03/04/25  0501 03/03/25 2058   WBC AUTO x10*3/uL 7.1 8.2 7.6   HEMOGLOBIN g/dL 13.7 14.3 15.8   HEMATOCRIT % 40.2 42.7 46.9*   PLATELETS AUTO x10*3/uL 223 234 233      Results from last 7 days   Lab Units 03/06/25  0433 03/05/25  0452 03/04/25  0501 03/03/25 2058   SODIUM mmol/L 137 139 140 139   POTASSIUM mmol/L 3.8 4.2 3.8 4.0   CHLORIDE mmol/L 102 100 103 104   CO2 mmol/L 25 30 31 28   BUN mg/dL 33* 20 22 22   CREATININE mg/dL 1.43* 1.10* 1.14* 1.20*   CALCIUM mg/dL 8.8 8.9 9.4 9.8   PROTEIN TOTAL g/dL  --   --   --  7.6   BILIRUBIN TOTAL mg/dL  --   --   --  0.4   ALK PHOS U/L  --   --   --  87   ALT U/L  --   --   --  38   AST U/L  --   --   --  26   GLUCOSE mg/dL 100* 105* 131* 90        PTT - No results in last year.  1.2   13.8 _     Last I/O:  I/O last 3 completed shifts:  In: 295.5 (2.3 mL/kg) [P.O.:295.5]  Out: - (0 mL/kg)   Weight: 129.1 kg     Past Cardiology Tests (Last 3 Years):  Echo: Pending today      Stress Test:  IMPRESSION:  1. Normal stress myocardial perfusion imaging in response to  pharmacologic stress.  2. Well-maintained left ventricular function.      Inpatient Medications:  Scheduled medications   Medication Dose Route Frequency    apixaban  5 mg oral BID    atorvastatin  10 mg oral Daily    buPROPion XL  300 mg oral Daily    empagliflozin  10 mg oral Daily    gabapentin  300 mg oral Nightly    lisinopril  5 mg oral Daily    metoprolol succinate XL  75 mg oral BID       Review of Systems   Constitutional: Positive for malaise/fatigue. Negative for fever.   Cardiovascular:  Negative for chest pain, orthopnea, palpitations and syncope.   Respiratory:  Negative for shortness of breath and wheezing.    Skin:  Negative for itching and rash.   Musculoskeletal:         Had episode of left shoulder discomfort yesterday     Gastrointestinal:  Positive for abdominal pain,  nausea and vomiting. Negative for diarrhea.   Genitourinary:  Negative for dysuria.   Neurological:  Positive for light-headedness. Negative for dizziness and weakness.        Physical Exam  Constitutional:       General: She is not in acute distress.     Appearance: Normal appearance. She is obese.   HENT:      Mouth/Throat:      Mouth: Mucous membranes are moist.   Neck:      Comments: Flat neck veins  Cardiovascular:      Rate and Rhythm: Normal rate and regular rhythm.      Heart sounds: Normal heart sounds.      Comments: Telemetry is sinus rhythm with PACs  Pulmonary:      Effort: Pulmonary effort is normal.      Breath sounds: Normal breath sounds.   Abdominal:      General: Bowel sounds are normal.      Palpations: Abdomen is soft.      Tenderness: There is no abdominal tenderness.   Musculoskeletal:      Right lower leg: No edema.      Left lower leg: No edema.      Comments: States that edema that she had is much improved   Skin:     General: Skin is warm and dry.   Neurological:      General: No focal deficit present.      Mental Status: She is alert and oriented to person, place, and time.   Psychiatric:         Mood and Affect: Affect is tearful.         Behavior: Behavior is cooperative.         ASSESSMENT/PLAN  Paroxysymal afibb; Frequent PAC/Tachycardia  Plan:  -Will recommend to check complete blood count, serum electrolytes, and assessment of renal function, TSH and FT4.   -Will recommend to check TTE to evaluate for structural abnormalities.   -Continue eliquis as taking   -Will recommend telemetry monitoring. Currently patient in NSR   -Will continue on flecainide as taking.   -Increase metoprolol to 50 mg bid   3-5-25: Patient did require further meds for nausea last evening but nausea went away and she slept well.  Upon getting up this morning she had some diffuse aching to her left shoulder area which quickly went away with no other associated symptoms.  Update echocardiogram today.  Basic  labs are unremarkable the but Dr. Weber did recommend thyroid studies.  She remains in sinus rhythm we will continue beta-blockers, flecainide and her Eliquis.  COVID and influenza markers were negative.  If echocardiogram is unremarkable no further cardiac workup will be necessary.  Addendum= echo results reviewed showing downturn in EF to 40 to 45% with generalized LV dysfunction.  She also has some RV involvement.  Recent testing in 2023 showed normal LV systolic function and stress test showed no evidence of ischemia.  Reviewed with Dr. Weber and we will discontinue flecainide which is contraindicated in LV dysfunction and we will add Jardiance and low-dose ACE inhibitor and monitor blood pressures.  We will maximize guideline directed medical therapy and reevaluate LV systolic function.  3/6/25: Change metoprolol tartrate to metoprolol succinate d/t LV systolic dysfunction and increase dose d/t tachycardia.  Will transition to metoprolol succinate 75 mg BID.  Having episodes of tachycardia with HR up to 150-160's. Currently SR with PAC's on telemetry. Flecainide was d/c d/t LV systolic dysfunction. Continue apixaban 5 mg BID.  Will consult EP for further recommendations.    Acute on chronic systolic heart failure  Not volume overloaded on exam  Beta blocker: change metoprolol tartrate to metoprolol succinate and increase dose d/t tachycardia.  Will transition to   metoprolol succinate 75 mg BID  ACE inhibitor/ARB/ARNI: decrease Lisinopril 2.5 mg daily d/t c/o lightheadedness and marginal BP  MRA: None  SGLT2i:  empagliflozin 10 mg daily  Diuretic: None  Hydralazine/Nitrate: None  Device: None  TTE 35/25 with LVEF 40-45%  2 gram sodium diet; 2 liter fluid restriction  Strict I&O  Daily weights     HTN  3/6/25: marginal BP at times and patient c/o lightheadedness. Has poor PO intake d/t N/V.  Will reduce lisinopril 2.5 mg daily    HLD  Continue atorvastatin 10 mg daily    H/o PE  SOB/Abnormal BNP  -Will suggest  to check flu/covid   -workup for nausea/malaise as per primary team           Emily Green, APRN-CNP  3/6/2025  9:18 AM

## 2025-03-06 NOTE — CARE PLAN
The patient's goals for the shift include  stay informed.     The clinical goals for the shift include Patient will remain free of falls throughout the shift,    Over the shift, the patient did make progress toward the following goals. Barriers to progression include understanding. Recommendations to address these barriers include education.
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
## 2025-03-06 NOTE — CONSULTS
Consults  History Of Present Illness:    Kerry Garcia is a 49 y.o. female with significant past medical history of atrial fibrillation (Eliquis), diabetes type 2, hypertension, pulmonary embolus and JOSE.  She presented to the emergency department for palpitations.  We are consulted on for atrial tachycardia.    Information gathered from patient and review of records.  Patient reports she has been feeling well but started noticing palpitations prior to admission.  Palpitations associated with shortness of breath.  She denies syncope or near syncope.  She reports she is inconsistent taking her flecainide which was started for paroxysmal atrial fibrillation.  EKG in emergency department showed atrial tachycardia with variable with rate of 107.  Review of telemetry showed episodes of tachycardia with heart rates up to 150 on 3/4.  Current telemetry shows atrial tachycardia with heart rates in the 70s.  Labs reviewed.  Echocardiogram completed which shows reduced LV function of 40 to 45%.  Prior echocardiogram showed normal LV function.  CT was negative for pulmonary embolus.  During hospitalization patient has developed nausea and currently being worked up for possible GI cause.      Last Recorded Vitals:  Vitals:    03/06/25 0406 03/06/25 0700 03/06/25 1100 03/06/25 1507   BP: 97/58 111/77 102/60 88/54   BP Location: Left leg Right arm Left arm Left arm   Patient Position: Lying Sitting Sitting Lying   Pulse: 76 76 76 76   Resp: 18 16 18 16   Temp: 36.3 °C (97.4 °F) 36.1 °C (97 °F) 36.1 °C (97 °F) 36.3 °C (97.4 °F)   TempSrc: Temporal Temporal Temporal Temporal   SpO2: 96% 94% 97% 95%   Weight: 129 kg (284 lb 9.8 oz)      Height:           Last Labs:  CBC - 3/6/2025:  4:33 AM  7.1 13.7 223    40.2      CMP - 3/6/2025:  4:33 AM  8.8 7.6 26 --- 0.4   _ 4.3 38 87      PTT - No results in last year.  1.2   13.8 _     Troponin I, High Sensitivity   Date/Time Value Ref Range Status   03/03/2025 08:58 PM <3 0 - 13 ng/L Final      Troponin I   Date/Time Value Ref Range Status   01/28/2023 04:11 PM 95 (H) 0 - 13 ng/L Final     Comment:     .  Less than 99th percentile of normal range cutoff-  Female and children under 18 years old <14 ng/L; Male <21 ng/L: Negative  Repeat testing should be performed if clinically indicated.   .  Female and children under 18 years old 14-50 ng/L; Male 21-50 ng/L:  Consistent with possible cardiac damage and possible increased clinical   risk. Serial measurements may help to assess extent of myocardial damage.   .  >50 ng/L: Consistent with cardiac damage, increased clinical risk and  myocardial infarction. Serial measurements may help assess extent of   myocardial damage.   .   NOTE: Children less than 1 year old may have higher baseline troponin   levels and results should be interpreted in conjunction with the overall   clinical context.   .  NOTE: Troponin I testing is performed using a different   testing methodology at [facility] than at other   [facility]. Direct result comparisons should only   be made within the same method.  This is a critical result.    Per Laboratory policy, critical results for this test   only qualify to the call list once per 24 hours.      01/28/2023 02:52 PM 74 (HH) 0 - 13 ng/L Final     Comment:     .  Less than 99th percentile of normal range cutoff-  Female and children under 18 years old <14 ng/L; Male <21 ng/L: Negative  Repeat testing should be performed if clinically indicated.   .  Female and children under 18 years old 14-50 ng/L; Male 21-50 ng/L:  Consistent with possible cardiac damage and possible increased clinical   risk. Serial measurements may help to assess extent of myocardial damage.   .  >50 ng/L: Consistent with cardiac damage, increased clinical risk and  myocardial infarction. Serial measurements may help assess extent of   myocardial damage.   .   NOTE: Children less than 1 year old may have higher baseline troponin   levels and  results should be interpreted in conjunction with the overall   clinical context.   .  NOTE: Troponin I testing is performed using a different   testing methodology at [facility] than at other   system hospitals. Direct result comparisons should only   be made within the same method.   16:05 01/28/2023.    Called- RB to RAJINDER Santana RN, 01/28/2023 16:05     05/31/2022 05:02 PM 5 0 - 13 ng/L Final     Comment:     .  Less than 99th percentile of normal range cutoff-  Female and children under 18 years old <14 ng/L; Male <21 ng/L: Negative  Repeat testing should be performed if clinically indicated.   .  Female and children under 18 years old 14-50 ng/L; Male 21-50 ng/L:  Consistent with possible cardiac damage and possible increased clinical   risk. Serial measurements may help to assess extent of myocardial damage.   .  >50 ng/L: Consistent with cardiac damage, increased clinical risk and  myocardial infarction. Serial measurements may help assess extent of   myocardial damage.   .   NOTE: Children less than 1 year old may have higher baseline troponin   levels and results should be interpreted in conjunction with the overall   clinical context.   .  NOTE: Troponin I testing is performed using a different   testing methodology at [facility] than at other   [facility]. Direct result comparisons should only   be made within the same method.       BNP   Date/Time Value Ref Range Status   03/03/2025 08:58  (H) 0 - 99 pg/mL Final   05/31/2022 05:03  (H) 0 - 99 pg/mL Final     Comment:     .  <100 pg/mL - Heart failure unlikely  100-299 pg/mL - Intermediate probability of acute heart  .               failure exacerbation. Correlate with clinical  .               context and patient history.    >=300 pg/mL - Heart Failure likely. Correlate with clinical  .               context and patient history.  BNP testing is performed using different testing   methodology at [facility] than at other   system hospitals. Direct result comparisons should   only be made within the same method.     12/20/2021 10:36  (H) 0 - 99 pg/mL Final     Comment:     .  <100 pg/mL - Heart failure unlikely  100-299 pg/mL - Intermediate probability of acute heart  .               failure exacerbation. Correlate with clinical  .               context and patient history.    >=300 pg/mL - Heart Failure likely. Correlate with clinical  .               context and patient history.  BNP testing is performed using different testing   methodology at [facility] than at other   system hospitals. Direct result comparisons should   only be made within the same method.       Hemoglobin A1C   Date/Time Value Ref Range Status   02/22/2024 03:01 PM 5.3 see below % Final   11/15/2022 09:41 AM 5.3 % Final     Comment:          Diagnosis of Diabetes-Adults   Non-Diabetic: < or = 5.6%   Increased risk for developing diabetes: 5.7-6.4%   Diagnostic of diabetes: > or = 6.5%  .       Monitoring of Diabetes                Age (y)     Therapeutic Goal (%)   Adults:          >18           <7.0   Pediatrics:    13-18           <7.5                   7-12           <8.0                   0- 6            7.5-8.5   American Diabetes Association. Diabetes Care 33(S1), Jan 2010.     07/22/2020 03:28 PM 5.3 % Final     Comment:          Diagnosis of Diabetes-Adults   Non-Diabetic: < or = 5.6%   Increased risk for developing diabetes: 5.7-6.4%   Diagnostic of diabetes: > or = 6.5%  .       Monitoring of Diabetes                Age (y)     Therapeutic Goal (%)   Adults:          >18           <7.0   Pediatrics:    13-18           <7.5                   7-12           <8.0                   0- 6            7.5-8.5   American Diabetes Association. Diabetes Care 33(S1), Jan 2010.       LDL Calculated   Date/Time Value Ref Range Status   02/22/2024 03:01  (H) <=99 mg/dL Final     Comment:                                  Near   Borderline      AGE      Desirable  Optimal    High     High     Very High     0-19 Y     0 - 109     ---    110-129   >/= 130     ----    20-24 Y     0 - 119     ---    120-159   >/= 160     ----      >24 Y     0 -  99   100-129  130-159   160-189     >/=190       VLDL   Date/Time Value Ref Range Status   02/22/2024 03:01 PM 38 0 - 40 mg/dL Final   11/15/2022 09:41 AM 42 (H) 0 - 40 mg/dL Final      Last I/O:  I/O last 3 completed shifts:  In: 295.5 (2.3 mL/kg) [P.O.:295.5]  Out: - (0 mL/kg)   Weight: 129.1 kg     Past Cardiology Tests (Last 3 Years):  EKG:  ECG 12 lead 03/04/2025      ECG 12 lead 03/03/2025      ECG 12 Lead 05/16/2024      ECG 12 Lead       ECG 12 Lead 04/05/2024    Echo:  Transthoracic Echo (TTE) Complete 03/05/2025  CONCLUSIONS:   1. The left ventricular systolic function is mildly decreased, with a visually estimated ejection fraction of 40-45%.   2. There is global hypokinesis of the left ventricle with minor regional variations.   3. Left ventricular diastolic filling was indeterminate.   4. There is mildly reduced right ventricular systolic function.   5. Right ventricular systolic pressure is within normal limits.  Ejection Fractions:  EF   Date/Time Value Ref Range Status   03/05/2025 01:18 PM 43 %      Cath:  No results found for this or any previous visit from the past 1095 days.    Stress Test:  Nuclear Stress Test 01/30/2023    Cardiac Imaging:  No results found for this or any previous visit from the past 1095 days.      Past Medical History:  She has a past medical history of Amenorrhea (05/17/2023), Body mass index (BMI)40.0-44.9, adult (10/19/2020), Cervicalgia (05/31/2022), Chronic cough (05/09/2016), Cough (05/17/2023), Encounter for follow-up examination after completed treatment for conditions other than malignant neoplasm (03/18/2021), Localized edema (10/29/2020), Other conditions influencing health status (04/12/2022), Other conditions influencing health status  (2021), Pelvic pain (2023), Personal history of other diseases of the nervous system and sense organs, Personal history of other diseases of the respiratory system (2016), Personal history of other endocrine, nutritional and metabolic disease (2021), Personal history of other endocrine, nutritional and metabolic disease (2022), Personal history of other mental and behavioral disorders, Personal history of other specified conditions, and Polyphagia (2023).    Past Surgical History:  She has a past surgical history that includes Cholecystectomy (2013);  section, classic (2013); Other surgical history (2022); and Dental surgery.      Social History:  She reports that she has been smoking cigarettes. She has a 1 pack-year smoking history. She has never used smokeless tobacco. She reports current alcohol use of about 10.0 standard drinks of alcohol per week. She reports that she does not use drugs.    Family History:  Family History   Problem Relation Name Age of Onset    Diabetes Mother Kitty salgado     Cancer Mother Kitty salgado     Anxiety disorder Mother Kitty naomi     Ovarian cancer Mother [facility] 42    Other (vertigo) Father      Diabetes Father      Neuropathy Father      Other (pad) Father      Other (cardiac disease) Paternal Grandmother          Allergies:  Hydrocodone and Hydrocodone-acetaminophen    Inpatient Medications:  Scheduled medications   Medication Dose Route Frequency    apixaban  5 mg oral BID    atorvastatin  10 mg oral Daily    buPROPion XL  300 mg oral Daily    empagliflozin  10 mg oral Daily    gabapentin  300 mg oral Nightly    [Held by provider] lisinopril  2.5 mg oral Daily    metoprolol succinate XL  75 mg oral BID     PRN medications   Medication    alum-mag hydroxide-simeth    benzocaine-menthol    dextromethorphan-guaifenesin    guaiFENesin    melatonin    metoprolol    [Held by provider] ondansetron    Or    [Held by provider]  ondansetron    polyethylene glycol    promethazine     Continuous Medications   Medication Dose Last Rate    sodium chloride 0.9%  42 mL/hr 42 mL/hr (03/06/25 1608)     Outpatient Medications:  Current Outpatient Medications   Medication Instructions    apixaban (ELIQUIS) 5 mg, oral, 2 times daily    atorvastatin (LIPITOR) 10 mg, oral, Daily    buPROPion XL (WELLBUTRIN XL) 300 mg, oral, Daily, TAKE 1 TABLET BY MOUTH EVERY DAY IN THE MORNING *DO NOT CRUSH, CHEW OR SPLIT*    docusate sodium (COLACE) 100 mg, oral, Daily    flecainide (TAMBOCOR) 100 mg, oral, 2 times daily    gabapentin (NEURONTIN) 300 mg, oral, Nightly, Take at bedtime.    losartan-hydrochlorothiazide (Hyzaar) 50-12.5 mg tablet 1 tablet, oral, Daily    metFORMIN (GLUCOPHAGE) 500 mg, oral, 2 times daily (morning and late afternoon)    metoprolol tartrate (LOPRESSOR) 25 mg, oral, 2 times daily    phentermine-topiramate (Qsymia) 3.75-23 mg capsule, ER multiphase 24 hr 1 capsule, oral, Daily       Physical Exam  Constitutional:       Appearance: Normal appearance.   Cardiovascular:      Rate and Rhythm: Normal rate and regular rhythm.      Pulses: Normal pulses.      Heart sounds: Normal heart sounds.   Pulmonary:      Effort: Pulmonary effort is normal.      Breath sounds: Normal breath sounds.   Musculoskeletal:         General: Normal range of motion.      Right lower leg: Edema present.      Left lower leg: Edema present.   Skin:     General: Skin is warm and dry.   Neurological:      Mental Status: She is alert and oriented to person, place, and time.   Psychiatric:         Mood and Affect: Mood normal.        Assessment/Plan   Atrial tachycardia with variable block.  Current telemetry shows heart rate mid 70s with little variability.  LV dysfunction possibly tachy mediated.  Flecainide discontinued due to LV dysfunction.  We will initiate amiodarone in the short-term as patient has tolerated it in the past.  We will follow-up with patient in 6-8  weeks and consider planning ablation.  Recommend continuing anticoagulation due to elevated [id number]-[id number] score due to history of atrial fibrillation.    Discussed with Dr. Derrick Weber updated on plan       Code Status:  Full Code    I spent 60 minutes in the professional and overall care of this patient.        Anastasia Hayden, APRN-CNP

## 2025-03-07 ENCOUNTER — PHARMACY VISIT (OUTPATIENT)
Dept: PHARMACY | Facility: CLINIC | Age: 50
End: 2025-03-07
Payer: MEDICAID

## 2025-03-07 VITALS
SYSTOLIC BLOOD PRESSURE: 116 MMHG | RESPIRATION RATE: 18 BRPM | WEIGHT: 284.61 LBS | HEART RATE: 78 BPM | TEMPERATURE: 97 F | DIASTOLIC BLOOD PRESSURE: 82 MMHG | OXYGEN SATURATION: 97 % | HEIGHT: 64 IN | BODY MASS INDEX: 48.59 KG/M2

## 2025-03-07 LAB
ANION GAP SERPL CALC-SCNC: 9 MMOL/L (ref 10–20)
BUN SERPL-MCNC: 26 MG/DL (ref 6–23)
CALCIUM SERPL-MCNC: 8 MG/DL (ref 8.6–10.3)
CHLORIDE SERPL-SCNC: 109 MMOL/L (ref 98–107)
CO2 SERPL-SCNC: 24 MMOL/L (ref 21–32)
CREAT SERPL-MCNC: 1.1 MG/DL (ref 0.5–1.05)
EGFRCR SERPLBLD CKD-EPI 2021: 61 ML/MIN/1.73M*2
GLUCOSE SERPL-MCNC: 98 MG/DL (ref 74–99)
MAGNESIUM SERPL-MCNC: 1.96 MG/DL (ref 1.6–2.4)
POTASSIUM SERPL-SCNC: 3.9 MMOL/L (ref 3.5–5.3)
SODIUM SERPL-SCNC: 138 MMOL/L (ref 136–145)

## 2025-03-07 PROCEDURE — 80048 BASIC METABOLIC PNL TOTAL CA: CPT | Performed by: NURSE PRACTITIONER

## 2025-03-07 PROCEDURE — G0378 HOSPITAL OBSERVATION PER HR: HCPCS

## 2025-03-07 PROCEDURE — 2500000001 HC RX 250 WO HCPCS SELF ADMINISTERED DRUGS (ALT 637 FOR MEDICARE OP): Performed by: NURSE PRACTITIONER

## 2025-03-07 PROCEDURE — 2500000002 HC RX 250 W HCPCS SELF ADMINISTERED DRUGS (ALT 637 FOR MEDICARE OP, ALT 636 FOR OP/ED): Performed by: NURSE PRACTITIONER

## 2025-03-07 PROCEDURE — RXMED WILLOW AMBULATORY MEDICATION CHARGE

## 2025-03-07 PROCEDURE — 2500000001 HC RX 250 WO HCPCS SELF ADMINISTERED DRUGS (ALT 637 FOR MEDICARE OP): Performed by: INTERNAL MEDICINE

## 2025-03-07 PROCEDURE — 99232 SBSQ HOSP IP/OBS MODERATE 35: CPT | Performed by: NURSE PRACTITIONER

## 2025-03-07 PROCEDURE — 2500000001 HC RX 250 WO HCPCS SELF ADMINISTERED DRUGS (ALT 637 FOR MEDICARE OP): Performed by: PHYSICIAN ASSISTANT

## 2025-03-07 PROCEDURE — 99239 HOSP IP/OBS DSCHRG MGMT >30: CPT | Performed by: HOSPITALIST

## 2025-03-07 PROCEDURE — 83735 ASSAY OF MAGNESIUM: CPT | Performed by: HOSPITALIST

## 2025-03-07 PROCEDURE — 36415 COLL VENOUS BLD VENIPUNCTURE: CPT | Performed by: HOSPITALIST

## 2025-03-07 RX ORDER — METOPROLOL SUCCINATE 50 MG/1
50 TABLET, EXTENDED RELEASE ORAL 2 TIMES DAILY
Status: DISCONTINUED | OUTPATIENT
Start: 2025-03-07 | End: 2025-03-07 | Stop reason: HOSPADM

## 2025-03-07 RX ORDER — METOPROLOL SUCCINATE 50 MG/1
50 TABLET, EXTENDED RELEASE ORAL 2 TIMES DAILY
Qty: 60 TABLET | Refills: 0 | Status: SHIPPED | OUTPATIENT
Start: 2025-03-07 | End: 2025-04-06

## 2025-03-07 RX ORDER — ACETAMINOPHEN 325 MG/1
650 TABLET ORAL EVERY 6 HOURS PRN
Status: DISCONTINUED | OUTPATIENT
Start: 2025-03-07 | End: 2025-03-07 | Stop reason: HOSPADM

## 2025-03-07 RX ORDER — AMIODARONE HYDROCHLORIDE 200 MG/1
TABLET ORAL
Qty: 78 TABLET | Refills: 0 | Status: SHIPPED | OUTPATIENT
Start: 2025-03-07 | End: 2025-04-18

## 2025-03-07 RX ADMIN — APIXABAN 5 MG: 5 TABLET, FILM COATED ORAL at 09:49

## 2025-03-07 RX ADMIN — AMIODARONE HYDROCHLORIDE 400 MG: 200 TABLET ORAL at 09:49

## 2025-03-07 RX ADMIN — METOPROLOL SUCCINATE 75 MG: 50 TABLET, EXTENDED RELEASE ORAL at 09:49

## 2025-03-07 RX ADMIN — EMPAGLIFLOZIN 10 MG: 10 TABLET, FILM COATED ORAL at 09:49

## 2025-03-07 RX ADMIN — BUPROPION HYDROCHLORIDE 300 MG: 150 TABLET, EXTENDED RELEASE ORAL at 09:49

## 2025-03-07 RX ADMIN — ATORVASTATIN CALCIUM 10 MG: 10 TABLET, FILM COATED ORAL at 09:49

## 2025-03-07 ASSESSMENT — ENCOUNTER SYMPTOMS
NAUSEA: 1
SYNCOPE: 0
LIGHT-HEADEDNESS: 1
PALPITATIONS: 0
DIZZINESS: 0
ORTHOPNEA: 0
WHEEZING: 0
VOMITING: 1
SHORTNESS OF BREATH: 0
ABDOMINAL PAIN: 1
DYSURIA: 0
WEAKNESS: 0
FEVER: 0
DIARRHEA: 0

## 2025-03-07 ASSESSMENT — COGNITIVE AND FUNCTIONAL STATUS - GENERAL
DAILY ACTIVITIY SCORE: 24
MOBILITY SCORE: 24

## 2025-03-07 ASSESSMENT — PAIN SCALES - GENERAL
PAINLEVEL_OUTOF10: 0 - NO PAIN
PAINLEVEL_OUTOF10: 4

## 2025-03-07 ASSESSMENT — PAIN - FUNCTIONAL ASSESSMENT: PAIN_FUNCTIONAL_ASSESSMENT: 0-10

## 2025-03-07 NOTE — PROGRESS NOTES
PROGRESS NOTE    HPI:  Kerry Garcia is a 49 y.o. female with PMHX of prediabets, HTN, HLD, Afibb, h/o prior PE, who is coming to hospital due to maliase, nausea, dizziness and sob.  Patient has history of A-fib in the past but has been in sinus rhythm for quite some time.  She is not taking medication as prescribed.  CT PE was done that didn't show any PE.         Cardiology consulted for palpitation/tachycardia and h/o Afibb     EKG shows NSR with frequent PAC in bigeminal pattern   Telemetry shows patient in NSR currently. Few episode of tachycardia overnight some of which may be atrial tachycardia.      BNP was elevated. Echo has been ordered.      Scr is abnormal at 1.2 on arrival        Subjective Data:  Patient resting comfortably.  She states that she had meds for nausea last evening and slept very well but when she got up to the restroom this morning she had a diffuse pain to her left shoulder area with no other symptoms.  No reported palpitations or trouble breathing and CT scan yesterday was negative for PE.  Telemetry shows sinus rhythm with PACs.  Will update echocardiogram today.  3/6/25: Reports ongoing abd pain and nausea. She will vomit if she tried to eat anything, but feels hungry.  She states she has tried to eat a quesadilla and omelet.  Educated to try bland foods such as toast, banana, applesauce and rice.  She states she has been able to drink liquids.  +palpitations.  No chest pain or SOB.  She is frustrated and tearful during encounter. SR with PACs on telemetry, HR 76 bpm.  3/7/25: Reports ongoing nausea.  She states she is able to drink liquids, but no food intake. Stat BMP ordered and is pending.  Magnesium 1.96.  SR on telemetry with HR 70's.     Overnight Events:    None     Objective Data:  Last Recorded Vitals:  Vitals:    03/06/25 1959 03/06/25 2300 03/07/25 0324 03/07/25 0834   BP: 86/60 82/59 107/68 125/78   BP Location: Left arm Left arm Left arm Left leg   Patient Position:  Lying Lying Lying Lying   Pulse: 77 78 78 82   Resp: 16 18 18 18   Temp: 36.8 °C (98.3 °F) 36.8 °C (98.3 °F) 36.6 °C (97.8 °F) 36.4 °C (97.6 °F)   TempSrc: Temporal Temporal Temporal Temporal   SpO2: 98% 97% 95% 99%   Weight:   129 kg (284 lb 9.8 oz)    Height:           Last Labs:  Results from last 7 days   Lab Units 03/06/25 0433 03/04/25  0501 03/03/25 2058   WBC AUTO x10*3/uL 7.1 8.2 7.6   HEMOGLOBIN g/dL 13.7 14.3 15.8   HEMATOCRIT % 40.2 42.7 46.9*   PLATELETS AUTO x10*3/uL 223 234 233      Results from last 7 days   Lab Units 03/06/25 0433 03/05/25 0452 03/04/25 0501 03/03/25 2058   SODIUM mmol/L 137 139 140 139   POTASSIUM mmol/L 3.8 4.2 3.8 4.0   CHLORIDE mmol/L 102 100 103 104   CO2 mmol/L 25 30 31 28   BUN mg/dL 33* 20 22 22   CREATININE mg/dL 1.43* 1.10* 1.14* 1.20*   CALCIUM mg/dL 8.8 8.9 9.4 9.8   PROTEIN TOTAL g/dL  --   --   --  7.6   BILIRUBIN TOTAL mg/dL  --   --   --  0.4   ALK PHOS U/L  --   --   --  87   ALT U/L  --   --   --  38   AST U/L  --   --   --  26   GLUCOSE mg/dL 100* 105* 131* 90        PTT - No results in last year.  1.2   13.8 _     Last I/O:  I/O last 3 completed shifts:  In: 1532.5 (11.9 mL/kg) [P.O.:295.5; I.V.:686.7 (5.3 mL/kg); IV Piggyback:550.3]  Out: - (0 mL/kg)   Weight: 129.1 kg     Past Cardiology Tests (Last 3 Years):  Echo: Pending today      Stress Test:  IMPRESSION:  1. Normal stress myocardial perfusion imaging in response to  pharmacologic stress.  2. Well-maintained left ventricular function.      Inpatient Medications:  Scheduled medications   Medication Dose Route Frequency    amiodarone  400 mg oral BID    Followed by    [START ON 3/19/2025] amiodarone  200 mg oral Daily    apixaban  5 mg oral BID    atorvastatin  10 mg oral Daily    buPROPion XL  300 mg oral Daily    empagliflozin  10 mg oral Daily    gabapentin  300 mg oral Nightly    metoprolol succinate XL  75 mg oral BID       Review of Systems   Constitutional: Positive for malaise/fatigue. Negative  for fever.   Cardiovascular:  Negative for chest pain, orthopnea, palpitations and syncope.   Respiratory:  Negative for shortness of breath and wheezing.    Skin:  Negative for itching and rash.   Musculoskeletal:              Gastrointestinal:  Positive for abdominal pain, nausea and vomiting. Negative for diarrhea.   Genitourinary:  Negative for dysuria.   Neurological:  Positive for light-headedness. Negative for dizziness and weakness.        Physical Exam  Constitutional:       General: She is not in acute distress.     Appearance: Normal appearance. She is obese.   HENT:      Mouth/Throat:      Mouth: Mucous membranes are moist.   Neck:      Comments: Flat neck veins  Cardiovascular:      Rate and Rhythm: Normal rate and regular rhythm.      Heart sounds: Normal heart sounds.      Comments: Telemetry is sinus rhythm  Pulmonary:      Effort: Pulmonary effort is normal.      Breath sounds: Normal breath sounds.   Abdominal:      General: Bowel sounds are normal.      Palpations: Abdomen is soft.      Tenderness: There is no abdominal tenderness.   Musculoskeletal:      Right lower leg: No edema.      Left lower leg: No edema.      Comments: States that edema that she had is much improved   Skin:     General: Skin is warm and dry.   Neurological:      General: No focal deficit present.      Mental Status: She is alert and oriented to person, place, and time.   Psychiatric:         Behavior: Behavior is cooperative.         ASSESSMENT/PLAN  Paroxysymal afibb; Frequent PAC/Tachycardia  Plan:  -Will recommend to check complete blood count, serum electrolytes, and assessment of renal function, TSH and FT4.   -Will recommend to check TTE to evaluate for structural abnormalities.   -Continue eliquis as taking   -Will recommend telemetry monitoring. Currently patient in NSR   -Will continue on flecainide as taking.   -Increase metoprolol to 50 mg bid   3-5-25: Patient did require further meds for nausea last evening  but nausea went away and she slept well.  Upon getting up this morning she had some diffuse aching to her left shoulder area which quickly went away with no other associated symptoms.  Update echocardiogram today.  Basic labs are unremarkable the but Dr. Weber did recommend thyroid studies.  She remains in sinus rhythm we will continue beta-blockers, flecainide and her Eliquis.  COVID and influenza markers were negative.  If echocardiogram is unremarkable no further cardiac workup will be necessary.  Addendum= echo results reviewed showing downturn in EF to 40 to 45% with generalized LV dysfunction.  She also has some RV involvement.  Recent testing in 2023 showed normal LV systolic function and stress test showed no evidence of ischemia.  Reviewed with Dr. Weber and we will discontinue flecainide which is contraindicated in LV dysfunction and we will add Jardiance and low-dose ACE inhibitor and monitor blood pressures.  We will maximize guideline directed medical therapy and reevaluate LV systolic function.  3/6/25: Change metoprolol tartrate to metoprolol succinate d/t LV systolic dysfunction and increase dose d/t tachycardia.  Will transition to metoprolol succinate 75 mg BID.  Having episodes of tachycardia with HR up to 150-160's. Currently SR with PAC's on telemetry. Flecainide was d/c d/t LV systolic dysfunction. Continue apixaban 5 mg BID.  Will consult EP for further recommendations.  3/7/25:  Seen by EP and they have recommended initiating amiodarone for short term.  They plan for f/u in 6-8 weeks.  She had episode of hypotension yesterday and did not receive PM dose of metoprolol succinate.  However, she had received lisinopril 5 mg yesterday morning as well.  Lisinopril has been d/c. Will decrease metoprolol succinate 50 mg BID.    Acute on chronic systolic heart failure  Not volume overloaded on exam  Beta blocker: change metoprolol tartrate to metoprolol succinate and increase dose d/t tachycardia.   Will transition to   metoprolol succinate 75 mg BID  ACE inhibitor/ARB/ARNI: decrease Lisinopril 2.5 mg daily d/t c/o lightheadedness and marginal BP  MRA: None  SGLT2i:  empagliflozin 10 mg daily  Diuretic: None  Hydralazine/Nitrate: None  Device: None  TTE 35/25 with LVEF 40-45%  2 gram sodium diet; 2 liter fluid restriction  Strict I&O  Daily weights   3/7/25: Creatinine 1.43 yesterday. Ordered stat BMP and is pending at the time of this note. Lisinopril has been d/c d/t hypotension.  Continue metoprolol succinate 75 mg BID and empagliflozin.    HTN  3/6/25: marginal BP at times and patient c/o lightheadedness. Has poor PO intake d/t N/V.  Will reduce lisinopril 2.5 mg daily  3/7/25: Hypotensive yesterday and PM dose of metoprolol was held.  Discontinue lisinopril. Decrease metoprolol succinate 50 mg BID. Continue to monitor BP and adjust antihypertensives as necessary.    HLD  Continue atorvastatin 10 mg daily    H/o PE  SOB/Abnormal BNP  -Will suggest to check flu/covid   -workup for nausea/malaise as per primary team           Emily Green, CLAUDETTE-CNP  3/7/2025  9:16 AM

## 2025-03-07 NOTE — DISCHARGE INSTRUCTIONS
Please follow-up with your family physician concerning metformin.  You reported that you are not diabetic.    Will hold metformin at this juncture given  nausea.    Recommend referral to GI concerning nausea.    May consider brain imaging with family physician if nausea persist.    Jardiance started in hospital by cardiology.    Please follow-up with your family physician concerning sleep apnea.    Hold metoprolol for heart rate less than 60, systolic blood pressure less than 100    Please follow up with  electrophysiology department as directed.    Consideration for possible ablation with electrophysiology, recommend follow-up within 6 to 8 weeks or as directed by electrophysiology service.      Patient should seek medical attention immediately if condition should worsen, new symptoms develop , no further improvement or recurrence of presenting symptomatology, patient warned.
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
## 2025-03-11 ENCOUNTER — TELEPHONE (OUTPATIENT)
Dept: PRIMARY CARE | Facility: CLINIC | Age: 50
End: 2025-03-11
Payer: MEDICAID

## 2025-03-11 ENCOUNTER — PATIENT OUTREACH (OUTPATIENT)
Dept: PRIMARY CARE | Facility: CLINIC | Age: 50
End: 2025-03-11
Payer: MEDICAID

## 2025-03-11 DIAGNOSIS — R11.2 NAUSEA AND VOMITING, UNSPECIFIED VOMITING TYPE: Primary | ICD-10-CM

## 2025-03-11 RX ORDER — PROMETHAZINE HYDROCHLORIDE 12.5 MG/1
12.5 TABLET ORAL EVERY 6 HOURS PRN
Qty: 28 TABLET | Refills: 0 | Status: SHIPPED | OUTPATIENT
Start: 2025-03-11 | End: 2025-03-18

## 2025-03-11 RX ORDER — PROMETHAZINE HYDROCHLORIDE 12.5 MG/1
12.5 TABLET ORAL EVERY 6 HOURS PRN
Qty: 28 TABLET | Refills: 0 | Status: SHIPPED | OUTPATIENT
Start: 2025-03-11 | End: 2025-03-11 | Stop reason: SDUPTHER

## 2025-03-11 NOTE — PROGRESS NOTES
Discharge Facility: [facility]   Discharge Diagnosis: Paroxysmal atrial fibrillation; Dyspnea, unspecified type   Admission Date: 3/4/2025   Discharge Date: 3/7/2025     PCP Appointment Date: TBD-office to arrange fup with PCP as needed  Specialist Appointment Date:    Schedule an appointment with COLLEEN Wright (Cardiology) in 3 days (3/10/2025)   Schedule an appointment with Tyson Gutierrez DO (Gastroenterology) in 3 days (3/10/2025); Follow-up concerning persistent nausea   Hospital Encounter and Summary Linked: Yes  ED to Hosp-Admission (Discharged) with Benito Roth DO; Oralia Meyers MD (03/03/2025)     Two attempts were made to reach patient within two business days after discharge. Voicemail left with contact information for patient to call back with any non-emergent questions or concerns.

## 2025-03-11 NOTE — TELEPHONE ENCOUNTER
I called patient she is fine with just seeing cardio. Patient is asking if you can prescription for Phenergan states she still get nauseated. States Zofran did not work for her.

## 2025-03-11 NOTE — TELEPHONE ENCOUNTER
----- Message from Dinorah RAYA sent at 3/11/2025 10:16 AM EDT -----  Regarding: FW: [facility] needed    ----- Message -----  From: Bautista Adamson RN  Sent: 3/11/2025  10:15 AM EDT  To: Do Wilkinson [place] [id number] Clinical Support Staff  Subject: Hospital fup needed                              Discharge facility: [facility]   Discharge diagnosis:   Paroxysmal atrial fibrillation; Dyspnea, unspecified type   Date of discharge:      3/7/2025   Unsuccessful attempts x2 to reach patient for PCP Follow-up  Please have office staff reach out to patient and schedule an appointment within 14 days from discharge date.

## 2025-03-25 ENCOUNTER — PATIENT OUTREACH (OUTPATIENT)
Dept: PRIMARY CARE | Facility: CLINIC | Age: 50
End: 2025-03-25
Payer: MEDICAID

## 2025-04-01 ENCOUNTER — APPOINTMENT (OUTPATIENT)
Dept: CARDIOLOGY | Facility: HOSPITAL | Age: 50
End: 2025-04-01
Payer: MEDICAID

## 2025-04-07 ENCOUNTER — TELEPHONE (OUTPATIENT)
Dept: PRIMARY CARE | Facility: CLINIC | Age: 50
End: 2025-04-07
Payer: MEDICAID

## 2025-04-07 DIAGNOSIS — I48.11 LONGSTANDING PERSISTENT ATRIAL FIBRILLATION (MULTI): ICD-10-CM

## 2025-04-07 DIAGNOSIS — I51.9 LV DYSFUNCTION: ICD-10-CM

## 2025-04-07 RX ORDER — METOPROLOL SUCCINATE 50 MG/1
50 TABLET, EXTENDED RELEASE ORAL 2 TIMES DAILY
Qty: 60 TABLET | Refills: 2 | Status: SHIPPED | OUTPATIENT
Start: 2025-04-07 | End: 2025-07-06

## 2025-04-07 RX ORDER — METOPROLOL SUCCINATE 50 MG/1
50 TABLET, EXTENDED RELEASE ORAL 2 TIMES DAILY
Qty: 180 TABLET | Refills: 3 | Status: CANCELLED | OUTPATIENT
Start: 2025-04-07 | End: 2026-04-02

## 2025-04-07 NOTE — TELEPHONE ENCOUNTER
Called patient to see what she was needed. States she needs Metoprolol Succinate XL 50mg  and Needs the Jardiance     Needs these sent to [facility]     These were medication from her hospital stay okay to fill?

## 2025-04-07 NOTE — TELEPHONE ENCOUNTER
Patient is calling regarding medications. She needs refills, says that she is getting the run around from every other office she called

## 2025-04-08 ENCOUNTER — TELEPHONE (OUTPATIENT)
Dept: PRIMARY CARE | Facility: CLINIC | Age: 50
End: 2025-04-08
Payer: MEDICAID

## 2025-04-08 NOTE — TELEPHONE ENCOUNTER
Kerry hd a 19 year old daughter Mirtha Gilliland and she is looking for a PCP. Would Jacqueline be willing to take her on as a new patient?

## 2025-04-08 NOTE — TELEPHONE ENCOUNTER
Ok for a new appointment, but when 40 minute is available. May be able to see another Provider sooner?

## 2025-04-09 PROBLEM — R63.2 POLYPHAGIA: Status: ACTIVE | Noted: 2025-04-09

## 2025-04-09 PROBLEM — R63.5 WEIGHT GAIN: Status: ACTIVE | Noted: 2025-04-09

## 2025-04-09 PROBLEM — I47.19 ATRIAL TACHYCARDIA (CMS-HCC): Status: ACTIVE | Noted: 2023-01-31

## 2025-04-09 PROBLEM — E88.819 INSULIN RESISTANCE: Status: ACTIVE | Noted: 2025-04-09

## 2025-04-09 PROBLEM — R10.2 PAIN IN PELVIS: Status: ACTIVE | Noted: 2023-05-05

## 2025-04-09 PROBLEM — I26.99 PULMONARY EMBOLISM: Status: ACTIVE | Noted: 2023-01-31

## 2025-04-09 PROBLEM — R05.9 COUGH: Status: ACTIVE | Noted: 2025-04-09

## 2025-04-09 PROBLEM — R06.02 SHORTNESS OF BREATH AT REST: Status: ACTIVE | Noted: 2025-04-09

## 2025-04-09 PROBLEM — E66.01 MORBID OBESITY (MULTI): Status: ACTIVE | Noted: 2025-04-09

## 2025-04-09 PROBLEM — N91.2 AMENORRHEA: Status: ACTIVE | Noted: 2022-11-15

## 2025-04-17 ENCOUNTER — APPOINTMENT (OUTPATIENT)
Dept: CARDIOLOGY | Facility: HOSPITAL | Age: 50
End: 2025-04-17
Payer: MEDICAID

## 2025-04-17 PROBLEM — I50.42 CHRONIC COMBINED SYSTOLIC AND DIASTOLIC HEART FAILURE: Status: ACTIVE | Noted: 2025-04-17

## 2025-05-01 ENCOUNTER — TELEPHONE (OUTPATIENT)
Dept: CARDIOLOGY | Facility: HOSPITAL | Age: 50
End: 2025-05-01

## 2025-05-01 ENCOUNTER — APPOINTMENT (OUTPATIENT)
Dept: CARDIOLOGY | Facility: CLINIC | Age: 50
End: 2025-05-01
Payer: MEDICAID

## 2025-05-01 NOTE — TELEPHONE ENCOUNTER
LVM for pt - changing appt time with Dr. Meza Monday 5/5 from 11:30am to 11am (accommodating provider OR schedule) and requested pt call office to confirm this change.

## 2025-05-05 ENCOUNTER — OFFICE VISIT (OUTPATIENT)
Dept: CARDIOLOGY | Facility: HOSPITAL | Age: 50
End: 2025-05-05
Payer: MEDICAID

## 2025-05-05 VITALS
WEIGHT: 284 LBS | BODY MASS INDEX: 48.49 KG/M2 | SYSTOLIC BLOOD PRESSURE: 116 MMHG | DIASTOLIC BLOOD PRESSURE: 78 MMHG | HEIGHT: 64 IN | HEART RATE: 79 BPM

## 2025-05-05 DIAGNOSIS — I48.0 PAROXYSMAL ATRIAL FIBRILLATION (MULTI): Primary | ICD-10-CM

## 2025-05-05 PROCEDURE — 99214 OFFICE O/P EST MOD 30 MIN: CPT | Performed by: INTERNAL MEDICINE

## 2025-05-05 PROCEDURE — 4004F PT TOBACCO SCREEN RCVD TLK: CPT | Performed by: INTERNAL MEDICINE

## 2025-05-05 PROCEDURE — 3078F DIAST BP <80 MM HG: CPT | Performed by: INTERNAL MEDICINE

## 2025-05-05 PROCEDURE — 3008F BODY MASS INDEX DOCD: CPT | Performed by: INTERNAL MEDICINE

## 2025-05-05 PROCEDURE — 93010 ELECTROCARDIOGRAM REPORT: CPT | Performed by: INTERNAL MEDICINE

## 2025-05-05 PROCEDURE — 99214 OFFICE O/P EST MOD 30 MIN: CPT | Mod: 25 | Performed by: INTERNAL MEDICINE

## 2025-05-05 PROCEDURE — 3074F SYST BP LT 130 MM HG: CPT | Performed by: INTERNAL MEDICINE

## 2025-05-05 PROCEDURE — 93005 ELECTROCARDIOGRAM TRACING: CPT | Performed by: INTERNAL MEDICINE
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
## 2025-05-06 ENCOUNTER — APPOINTMENT (OUTPATIENT)
Dept: PRIMARY CARE | Facility: CLINIC | Age: 50
End: 2025-05-06
Payer: MEDICAID

## 2025-05-06 VITALS
HEIGHT: 64 IN | HEART RATE: 84 BPM | BODY MASS INDEX: 49.34 KG/M2 | SYSTOLIC BLOOD PRESSURE: 120 MMHG | DIASTOLIC BLOOD PRESSURE: 80 MMHG | WEIGHT: 289 LBS

## 2025-05-06 DIAGNOSIS — E55.9 VITAMIN D DEFICIENCY: ICD-10-CM

## 2025-05-06 DIAGNOSIS — K59.00 CONSTIPATION, UNSPECIFIED CONSTIPATION TYPE: ICD-10-CM

## 2025-05-06 DIAGNOSIS — E53.8 VITAMIN B12 DEFICIENCY: ICD-10-CM

## 2025-05-06 DIAGNOSIS — I51.9 LV DYSFUNCTION: ICD-10-CM

## 2025-05-06 DIAGNOSIS — Z12.31 VISIT FOR SCREENING MAMMOGRAM: ICD-10-CM

## 2025-05-06 DIAGNOSIS — I10 BENIGN ESSENTIAL HYPERTENSION: ICD-10-CM

## 2025-05-06 DIAGNOSIS — R29.818 SUSPECTED SLEEP APNEA: Primary | ICD-10-CM

## 2025-05-06 DIAGNOSIS — I48.0 PAROXYSMAL ATRIAL FIBRILLATION (MULTI): ICD-10-CM

## 2025-05-06 DIAGNOSIS — Z00.00 HEALTHCARE MAINTENANCE: ICD-10-CM

## 2025-05-06 DIAGNOSIS — I82.4Y9 DEEP VEIN THROMBOSIS (DVT) OF PROXIMAL LOWER EXTREMITY, UNSPECIFIED CHRONICITY, UNSPECIFIED LATERALITY: ICD-10-CM

## 2025-05-06 DIAGNOSIS — F41.1 GENERALIZED ANXIETY DISORDER: ICD-10-CM

## 2025-05-06 DIAGNOSIS — E88.819 INSULIN RESISTANCE: ICD-10-CM

## 2025-05-06 DIAGNOSIS — E78.00 ELEVATED LDL CHOLESTEROL LEVEL: ICD-10-CM

## 2025-05-06 PROCEDURE — 3074F SYST BP LT 130 MM HG: CPT | Performed by: CLINICAL NURSE SPECIALIST

## 2025-05-06 PROCEDURE — 4004F PT TOBACCO SCREEN RCVD TLK: CPT | Performed by: CLINICAL NURSE SPECIALIST

## 2025-05-06 PROCEDURE — 3079F DIAST BP 80-89 MM HG: CPT | Performed by: CLINICAL NURSE SPECIALIST

## 2025-05-06 PROCEDURE — 99214 OFFICE O/P EST MOD 30 MIN: CPT | Performed by: CLINICAL NURSE SPECIALIST

## 2025-05-06 PROCEDURE — 3008F BODY MASS INDEX DOCD: CPT | Performed by: CLINICAL NURSE SPECIALIST

## 2025-05-06 RX ORDER — TIRZEPATIDE 2.5 MG/0.1
SYRINGE (ML) SUBCUTANEOUS
COMMUNITY

## 2025-05-06 RX ORDER — DOCUSATE SODIUM 100 MG/1
100 CAPSULE, LIQUID FILLED ORAL 2 TIMES DAILY
Qty: 30 CAPSULE | Refills: 10 | Status: SHIPPED | OUTPATIENT
Start: 2025-05-06

## 2025-05-06 ASSESSMENT — ENCOUNTER SYMPTOMS
DYSURIA: 0
TROUBLE SWALLOWING: 0
DIARRHEA: 0
SEIZURES: 0
PHOTOPHOBIA: 0
ACTIVITY CHANGE: 0
CHEST TIGHTNESS: 0
NECK PAIN: 0
SHORTNESS OF BREATH: 0
JOINT SWELLING: 0
SLEEP DISTURBANCE: 0
APPETITE CHANGE: 0
COUGH: 0
PALPITATIONS: 0
HEADACHES: 0
ARTHRALGIAS: 0
DEPRESSION: 0
HEMATURIA: 0
FEVER: 0
VOMITING: 0
LOSS OF SENSATION IN FEET: 0
OCCASIONAL FEELINGS OF UNSTEADINESS: 0
WHEEZING: 0
DIZZINESS: 0
FLANK PAIN: 0
CONSTIPATION: 0
UNEXPECTED WEIGHT CHANGE: 0
POLYDIPSIA: 0
BRUISES/BLEEDS EASILY: 0
EYE PAIN: 0
NAUSEA: 0
WOUND: 0
BACK PAIN: 0
CHILLS: 0
FATIGUE: 1
BLOOD IN STOOL: 0
SORE THROAT: 0
ABDOMINAL PAIN: 0
CONFUSION: 0
MYALGIAS: 0

## 2025-05-06 NOTE — PROGRESS NOTES
Subjective   Patient ID: Kerry Garcia is a 49 y.o. female who presents for Follow-up (Follow up).  HPI    Here today as a follow up appointment.     Admission in March, following with Cardiology. Planning Cardioversion this month.      Still working on weight loss. No longer going to [employer]. Has been trying to work on her diet. Chronic issues with struggling to lose weight. Stopped eating fast food. Followed with Bariatrics. States that she has updated her home gym and trying to be more active. Weight has been stable but she is not having any success in weight loss. Previously completed Phentermine.  Interested in medication options.      She has chronic issues with Peripheral Edema, previously was on Lasix but states that it made her urinate too much. Did not feel that it was beneficial for her. Denies any complaints of chest pain or shortness of breath. Lymphedema, followed with Rehab and Vascular.      Mother previously diagnosed in her 40's with Colon Cancer, Father with Polyps removed. Patient declined having a Colonoscopy done. Cologuard negative.      Continue her Wellbutrin.      Constipation, no improvement with OTC medications, Miralax. Requesting Rx.     Sleep Apnea, suspected. Previously diagnosed, but over a few years ago. No records available. Currently snore. Increased fatigue.     Review of Systems   Constitutional:  Positive for fatigue. Negative for activity change, appetite change, chills, fever and unexpected weight change.   HENT:  Negative for ear pain, hearing loss, nosebleeds, sore throat, tinnitus and trouble swallowing.    Eyes:  Negative for photophobia, pain and visual disturbance.   Respiratory:  Negative for cough, chest tightness, shortness of breath and wheezing.    Cardiovascular:  Negative for chest pain, palpitations and leg swelling.   Gastrointestinal:  Negative for abdominal pain, blood in stool, constipation, diarrhea, nausea and vomiting.   Endocrine: Negative for  cold intolerance, heat intolerance, polydipsia and polyuria.   Genitourinary:  Negative for dysuria, flank pain and hematuria.   Musculoskeletal:  Negative for arthralgias, back pain, joint swelling, myalgias and neck pain.   Skin:  Negative for pallor, rash and wound.   Allergic/Immunologic: Negative for immunocompromised state.   Neurological:  Negative for dizziness, seizures and headaches.   Hematological:  Does not bruise/bleed easily.   Psychiatric/Behavioral:  Negative for confusion and sleep disturbance.        Objective   Physical Exam  Vitals and nursing note reviewed.   Constitutional:       General: She is not in acute distress.     Appearance: Normal appearance.   HENT:      Head: Normocephalic.      Nose: Nose normal.   Eyes:      Conjunctiva/sclera: Conjunctivae normal.   Neck:      Vascular: No carotid bruit.   Cardiovascular:      Rate and Rhythm: Normal rate and regular rhythm.      Pulses: Normal pulses.      Heart sounds: Normal heart sounds.   Pulmonary:      Effort: Pulmonary effort is normal.      Breath sounds: Normal breath sounds.   Abdominal:      General: Bowel sounds are normal.      Palpations: Abdomen is soft.   Musculoskeletal:         General: Normal range of motion.      Cervical back: Normal range of motion.   Skin:     General: Skin is warm and dry.   Neurological:      Mental Status: She is alert and oriented to person, place, and time. Mental status is at baseline.   Psychiatric:         Mood and Affect: Mood normal.         Behavior: Behavior normal.         Assessment/Plan         Due for updated lab work. Ordered.      #1. Lymphedema: Continue to follow with Specialist as previously determined.   #2. Obesity & #3. Weight Gain: BMI: 49.61. Followed with Bariatrics. Tirzepatide. Follow up with medication effectiveness.   #4. Tobacco Use: Patient advised to quit smoking tobacco. The risks of smoking were reviewed with the patient and she was offered medication and/or smoking  cessation counseling to help. Declined at this time. States that she only smokes when she drinks. Vaping, less.   #5. Vitamin B12 Deficiency: Vitamin B12 OTC.   #6. Hypertension, persistent atrial tachycardia: Blood pressure controlled at OV today. Continue to follow with cardiology as previously determined.   #7. DVT, Pulmonary Embolism: Continue Eliquis as prescribed.   #8. Back Pain: OTC pain relievers. Flexeril PRN.   #9. Insulin Resistance: Continue Metformin. Reevaluate with next lab work.   #10. Anxiety: Continue Wellbutrin. Follow up with medication effectiveness.   #11. Elevated LDL: Discussed Prescription therapy. Lifestyle changes. Continue to monitor.   #12. Vitamin D Deficiency: Reevaluate with next lab work.   #13. Constipation: Medication as prescribed.   #14. Suspected Apnea: Previously diagnosed per patient but no records available. Will obtain updated Sleep Study.      Last Mammogram: June 2024, negative. Ordered for 2025. Holding off at this time.   Unable to update Immunization due to Insurance.   Cologuard: August 2023, negative.   Wellness: January 2025.     Jacqueline Mullins, CLAUDETTE-CNS 05/06/25 10:04 AM

## 2025-05-07 DIAGNOSIS — E66.813 CLASS 3 SEVERE OBESITY WITH SERIOUS COMORBIDITY AND BODY MASS INDEX (BMI) OF 45.0 TO 49.9 IN ADULT, UNSPECIFIED OBESITY TYPE: ICD-10-CM

## 2025-05-07 DIAGNOSIS — I48.0 PAROXYSMAL ATRIAL FIBRILLATION (MULTI): ICD-10-CM

## 2025-05-07 DIAGNOSIS — E55.9 VITAMIN D DEFICIENCY: ICD-10-CM

## 2025-05-07 DIAGNOSIS — E53.8 VITAMIN B12 DEFICIENCY: ICD-10-CM

## 2025-05-07 DIAGNOSIS — E78.00 ELEVATED LDL CHOLESTEROL LEVEL: Primary | ICD-10-CM

## 2025-05-07 DIAGNOSIS — I82.4Y9 DEEP VEIN THROMBOSIS (DVT) OF PROXIMAL LOWER EXTREMITY, UNSPECIFIED CHRONICITY, UNSPECIFIED LATERALITY: ICD-10-CM

## 2025-05-07 LAB
ALBUMIN SERPL-MCNC: 4.4 G/DL (ref 3.6–5.1)
ALP SERPL-CCNC: 74 U/L (ref 37–153)
ALT SERPL-CCNC: 15 U/L (ref 6–29)
ANION GAP SERPL CALCULATED.4IONS-SCNC: 8 MMOL/L (CALC) (ref 7–17)
AST SERPL-CCNC: 21 U/L (ref 10–35)
BILIRUB SERPL-MCNC: 0.7 MG/DL (ref 0.2–1.2)
BUN SERPL-MCNC: 19 MG/DL (ref 7–25)
CALCIUM SERPL-MCNC: 9.3 MG/DL (ref 8.6–10.4)
CHLORIDE SERPL-SCNC: 106 MMOL/L (ref 98–110)
CO2 SERPL-SCNC: 26 MMOL/L (ref 20–32)
CREAT SERPL-MCNC: 0.97 MG/DL (ref 0.5–1.03)
EGFRCR SERPLBLD CKD-EPI 2021: 71 ML/MIN/1.73M2
GLUCOSE SERPL-MCNC: 89 MG/DL (ref 65–99)
POTASSIUM SERPL-SCNC: 5.1 MMOL/L (ref 3.5–5.3)
PROT SERPL-MCNC: 7.1 G/DL (ref 6.1–8.1)
SODIUM SERPL-SCNC: 140 MMOL/L (ref 135–146)
TSH SERPL-ACNC: 4.23 MIU/L

## 2025-05-11 LAB
ATRIAL RATE: 184 BPM
P AXIS: -80 DEGREES
P OFFSET: 153 MS
P ONSET: 93 MS
Q ONSET: 211 MS
QRS COUNT: 13 BEATS
QRS DURATION: 82 MS
QT INTERVAL: 182 MS
QTC CALCULATION(BAZETT): 208 MS
QTC FREDERICIA: 199 MS
R AXIS: -53 DEGREES
T AXIS: 138 DEGREES
T OFFSET: 302 MS
VENTRICULAR RATE: 79 BPM

## 2025-05-12 ENCOUNTER — TELEPHONE (OUTPATIENT)
Dept: CARDIOLOGY | Facility: HOSPITAL | Age: 50
End: 2025-05-12
Payer: MEDICAID

## 2025-05-12 DIAGNOSIS — I48.0 PAROXYSMAL ATRIAL FIBRILLATION (MULTI): Primary | ICD-10-CM

## 2025-05-12 NOTE — TELEPHONE ENCOUNTER
The patient is scheduled for DCCV with Dr. Meza on 5/19/025 at [facility] with an arrival time of 6:30. Patient plans to update labs tomorrow. NPO after midnight the night before cardioversion. Take morning medication with a sip of water except hold Jardiance for 3 days prior to cardioversion. Patient has confirmed no missed doses of Eliquis in the past 30 days. She will need a ride home after the procedure. Patient verbalized understanding of instructions including appointment date, time, and location. All questions answered.

## 2025-05-12 NOTE — TELEPHONE ENCOUNTER
Patient called back and stated she missed her evening dose of Eliquis on 5/7. Reviewed with Dr. Meza and may proceed as scheduled. Patient notified and verbalized understanding.

## 2025-05-15 LAB
ERYTHROCYTE [DISTWIDTH] IN BLOOD BY AUTOMATED COUNT: 13.1 % (ref 11–15)
HCT VFR BLD AUTO: 44.5 % (ref 35–45)
HGB BLD-MCNC: 14.7 G/DL (ref 11.7–15.5)
MCH RBC QN AUTO: 31.9 PG (ref 27–33)
MCHC RBC AUTO-ENTMCNC: 33 G/DL (ref 32–36)
MCV RBC AUTO: 96.5 FL (ref 80–100)
PLATELET # BLD AUTO: 215 THOUSAND/UL (ref 140–400)
PMV BLD REES-ECKER: 9.3 FL (ref 7.5–12.5)
RBC # BLD AUTO: 4.61 MILLION/UL (ref 3.8–5.1)
WBC # BLD AUTO: 4.8 THOUSAND/UL (ref 3.8–10.8)

## 2025-05-19 ENCOUNTER — PHARMACY VISIT (OUTPATIENT)
Dept: PHARMACY | Facility: CLINIC | Age: 50
End: 2025-05-19
Payer: MEDICAID

## 2025-05-19 ENCOUNTER — ANESTHESIA EVENT (OUTPATIENT)
Dept: CARDIOLOGY | Facility: HOSPITAL | Age: 50
End: 2025-05-19
Payer: MEDICAID

## 2025-05-19 ENCOUNTER — HOSPITAL ENCOUNTER (OUTPATIENT)
Dept: CARDIOLOGY | Facility: HOSPITAL | Age: 50
Discharge: HOME | End: 2025-05-19
Payer: MEDICAID

## 2025-05-19 ENCOUNTER — ANESTHESIA (OUTPATIENT)
Dept: CARDIOLOGY | Facility: HOSPITAL | Age: 50
End: 2025-05-19
Payer: MEDICAID

## 2025-05-19 VITALS
HEART RATE: 60 BPM | RESPIRATION RATE: 20 BRPM | OXYGEN SATURATION: 100 % | SYSTOLIC BLOOD PRESSURE: 117 MMHG | BODY MASS INDEX: 48.41 KG/M2 | WEIGHT: 282 LBS | DIASTOLIC BLOOD PRESSURE: 78 MMHG | TEMPERATURE: 97.3 F

## 2025-05-19 DIAGNOSIS — I48.0 PAROXYSMAL ATRIAL FIBRILLATION (MULTI): ICD-10-CM

## 2025-05-19 DIAGNOSIS — I48.11 LONGSTANDING PERSISTENT ATRIAL FIBRILLATION (MULTI): ICD-10-CM

## 2025-05-19 LAB
ATRIAL RATE: 324 BPM
P AXIS: 82 DEGREES
P OFFSET: 172 MS
P ONSET: 133 MS
Q ONSET: 206 MS
QRS COUNT: 14 BEATS
QRS DURATION: 96 MS
QT INTERVAL: 400 MS
QTC CALCULATION(BAZETT): 464 MS
QTC FREDERICIA: 442 MS
R AXIS: -36 DEGREES
T AXIS: 25 DEGREES
T OFFSET: 406 MS
VENTRICULAR RATE: 81 BPM

## 2025-05-19 PROCEDURE — RXMED WILLOW AMBULATORY MEDICATION CHARGE

## 2025-05-19 PROCEDURE — 7100000009 HC PHASE TWO TIME - INITIAL BASE CHARGE

## 2025-05-19 PROCEDURE — 7100000010 HC PHASE TWO TIME - EACH INCREMENTAL 1 MINUTE

## 2025-05-19 PROCEDURE — 3700000002 HC GENERAL ANESTHESIA TIME - EACH INCREMENTAL 1 MINUTE

## 2025-05-19 PROCEDURE — 93010 ELECTROCARDIOGRAM REPORT: CPT | Performed by: INTERNAL MEDICINE

## 2025-05-19 PROCEDURE — 3700000001 HC GENERAL ANESTHESIA TIME - INITIAL BASE CHARGE

## 2025-05-19 PROCEDURE — 2500000004 HC RX 250 GENERAL PHARMACY W/ HCPCS (ALT 636 FOR OP/ED): Performed by: NURSE ANESTHETIST, CERTIFIED REGISTERED

## 2025-05-19 PROCEDURE — 93005 ELECTROCARDIOGRAM TRACING: CPT

## 2025-05-19 PROCEDURE — 99152 MOD SED SAME PHYS/QHP 5/>YRS: CPT

## 2025-05-19 PROCEDURE — A92960 PR CARDIOVERSION, ELECTIVE;EXTERN: Performed by: NURSE ANESTHETIST, CERTIFIED REGISTERED

## 2025-05-19 RX ORDER — METOCLOPRAMIDE HYDROCHLORIDE 5 MG/ML
10 INJECTION INTRAMUSCULAR; INTRAVENOUS ONCE
OUTPATIENT
Start: 2025-05-19

## 2025-05-19 RX ORDER — LIDOCAINE HCL/PF 100 MG/5ML
SYRINGE (ML) INTRAVENOUS AS NEEDED
Status: DISCONTINUED | OUTPATIENT
Start: 2025-05-19 | End: 2025-05-19

## 2025-05-19 RX ORDER — FAMOTIDINE 10 MG/ML
20 INJECTION, SOLUTION INTRAVENOUS ONCE
OUTPATIENT
Start: 2025-05-19

## 2025-05-19 RX ORDER — SODIUM CITRATE AND CITRIC ACID MONOHYDRATE 334; 500 MG/5ML; MG/5ML
30 SOLUTION ORAL ONCE
OUTPATIENT
Start: 2025-05-19 | End: 2025-05-19

## 2025-05-19 RX ORDER — METOPROLOL SUCCINATE 50 MG/1
50 TABLET, EXTENDED RELEASE ORAL 2 TIMES DAILY
Qty: 60 TABLET | Refills: 5 | Status: SHIPPED | OUTPATIENT
Start: 2025-05-19 | End: 2025-08-17

## 2025-05-19 RX ORDER — PROPOFOL 10 MG/ML
INJECTION, EMULSION INTRAVENOUS AS NEEDED
Status: DISCONTINUED | OUTPATIENT
Start: 2025-05-19 | End: 2025-05-19

## 2025-05-19 RX ADMIN — LIDOCAINE HYDROCHLORIDE 20 MG: 20 INJECTION INTRAVENOUS at 07:44

## 2025-05-19 RX ADMIN — PROPOFOL 80 MG: 10 INJECTION, EMULSION INTRAVENOUS at 07:43

## 2025-05-19 RX ADMIN — PROPOFOL 20 MG: 10 INJECTION, EMULSION INTRAVENOUS at 07:44

## 2025-05-19 RX ADMIN — SODIUM CHLORIDE: 9 INJECTION, SOLUTION INTRAVENOUS at 07:40

## 2025-05-19 SDOH — HEALTH STABILITY: MENTAL HEALTH: CURRENT SMOKER: 1

## 2025-05-19 ASSESSMENT — PAIN - FUNCTIONAL ASSESSMENT
PAIN_FUNCTIONAL_ASSESSMENT: 0-10
PAIN_FUNCTIONAL_ASSESSMENT: 0-10

## 2025-05-19 ASSESSMENT — PAIN SCALES - GENERAL
PAIN_LEVEL: 0
PAINLEVEL_OUTOF10: 0 - NO PAIN
PAINLEVEL_OUTOF10: 0 - NO PAIN

## 2025-05-19 NOTE — ANESTHESIA PREPROCEDURE EVALUATION
Patient: Kerry Garcia    Procedure Information       Date/Time: 05/19/25 0730    Scheduled providers: Ismael Meza MD    Procedure: CARDIOVERSION EXTERNAL    Location: [facility]            Relevant Problems   Anesthesia (within normal limits)      Cardiac   (+) A-fib (Multi)   (+) Atrial tachycardia   (+) Atypical chest pain   (+) Benign essential hypertension   (+) Paroxysmal atrial fibrillation (Multi)      Pulmonary   (+) Pulmonary embolism      Neuro   (+) Generalized anxiety disorder      Endocrine   (+) Morbid obesity (Multi)      Hematology   (+) Deep vein thrombosis (DVT) (Multi)       Clinical information reviewed:   Tobacco  Allergies  Meds   Med Hx  Surg Hx  OB Status  Fam Hx  Soc   Hx        NPO Detail:  NPO/Void Status  Date of Last Liquid: 05/19/25  Time of Last Liquid: 0600  Date of Last Solid: 05/18/25  Time of Last Solid: 2100  Last Intake Type: Clear fluids         Physical Exam    Airway  Mallampati: II  TM distance: >3 FB  Neck ROM: full  Mouth opening: 3 or more finger widths     Cardiovascular   Rhythm: irregular  Comments: Afib   Dental   Comments: Edentulous     Pulmonary - normal exam   Abdominal (+) obese             Anesthesia Plan    History of general anesthesia?: yes  History of complications of general anesthesia?: no    ASA 3     MAC     The patient is a current smoker.  Patient was previously instructed to abstain from smoking on day of procedure.  Patient did not smoke on day of procedure.    intravenous induction   Anesthetic plan and risks discussed with patient.    Plan discussed with CRNA.

## 2025-05-19 NOTE — ANESTHESIA POSTPROCEDURE EVALUATION
Patient: Kerry Garcia    Procedure Summary       Date: 05/19/25 Room / Location: [facility]    Anesthesia Start: 0740 Anesthesia Stop: 0754    Procedure: CARDIOVERSION EXTERNAL Diagnosis: Paroxysmal atrial fibrillation (Multi)    Scheduled Providers: Ismael Meza MD Responsible Provider: YONI Ceballos    Anesthesia Type: MAC ASA Status: 3            Anesthesia Type: MAC    Vitals Value Taken Time   /78 05/19/25 07:50   Temp 36.3 °C (97.3 °F) 05/19/25 07:50   Pulse 60 05/19/25 07:50   Resp 20 05/19/25 07:50   SpO2 100 % 05/19/25 07:50       Anesthesia Post Evaluation    Patient location during evaluation: bedside  Patient participation: complete - patient participated  Level of consciousness: awake and alert  Pain score: 0  Pain management: adequate  Airway patency: patent  Cardiovascular status: acceptable and hemodynamically stable  Respiratory status: acceptable and room air  Hydration status: acceptable  Postoperative Nausea and Vomiting: none        There were no known notable events for this encounter.

## 2025-05-19 NOTE — NURSING NOTE
IV removed and dressing applied.     Homegoing instructions specific to procedure given, patient verbalized understanding.  Discharge criteria met: patient easily arousable, responding appropriately. Vital signs +/- 20% of preprocedure baseline. Significant complications absent. Ambulates without dizziness. Pulse ox > 92% on room air or baseline O2.     Patient discharged to home, accompanied by family. Discharged via wheelchair.

## 2025-05-20 NOTE — PRE-SEDATION DOCUMENTATION
Cardiology Preprocedure Note    Kerry Garcia   Indication for procedure: The encounter diagnosis was Paroxysmal atrial fibrillation (Multi). Patient here for cardioversion.         /78   Pulse 60   Temp 36.3 °C (97.3 °F) (Temporal)   Resp 20   Wt 128 kg (282 lb)   SpO2 100%   BMI 48.41 kg/m²    Relevant Labs:   Lab Results   Component Value Date    CREATININE 0.97 05/06/2025    EGFR 71 05/06/2025    INR 1.2 (H) 03/03/2025    PROTIME 13.8 (H) 03/03/2025       Planned Sedation/Anesthesia: Deep    Airway assessment: normal    Directed physical examination: General: Alert and Oriented, No distress, cooperative. Lungs: Clear to auscultation bilaterally, no wheezes, rhonci, or rales. respirations unlabored Heart: irregular rhythm and rate, no murmur, pulses palpable     Mallampati: see anesthesia note     ASA Score: ASA 3 - Patient with moderate systemic disease with functional limitations    Benefits, risks and alternatives of procedure and planned sedation have been discussed with the patient and/or their representative. All questions answered and they agree to proceed.     Jami Campbell, APRN-CNP

## 2025-05-22 ENCOUNTER — TELEPHONE (OUTPATIENT)
Dept: PRIMARY CARE | Facility: CLINIC | Age: 50
End: 2025-05-22
Payer: MEDICAID

## 2025-05-22 ENCOUNTER — TELEPHONE (OUTPATIENT)
Dept: CARDIOLOGY | Facility: HOSPITAL | Age: 50
End: 2025-05-22
Payer: MEDICAID

## 2025-05-22 DIAGNOSIS — I48.11 LONGSTANDING PERSISTENT ATRIAL FIBRILLATION (MULTI): ICD-10-CM

## 2025-05-22 RX ORDER — AMIODARONE HYDROCHLORIDE 200 MG/1
200 TABLET ORAL DAILY
Qty: 60 TABLET | Refills: 0 | Status: SHIPPED | OUTPATIENT
Start: 2025-05-22 | End: 2025-07-21

## 2025-05-22 NOTE — TELEPHONE ENCOUNTER
Leigh asked me regarding this, I informed her she needed to check with Cardiology as they prescribe.

## 2025-05-22 NOTE — TELEPHONE ENCOUNTER
Patient called in at this time and I spoke with patient,     She states that she forgot about this medication that needs refilled and she has one left.     She would like this   amiodarone (Pacerone) 200 mg     To be picked up at   [id number] - [place] - [address] AT [address] OF [address] [facility]  [address] [place] [address]  Phone: [phone]  Fax: [phone]  KAELA #: [id number]       Thank you!  Colby ALTAMIRANO

## 2025-05-22 NOTE — TELEPHONE ENCOUNTER
Returned patient's call and left  asking her to call back to discuss her symptoms. Refill of amiodarone to be sent to [facility] in [place] per patient request.

## 2025-05-22 NOTE — TELEPHONE ENCOUNTER
Patient called in for a refill of her amiodarone (Pacerone) tablet 200 mg   she said it was from  but she could not get in touch w/ him. I did give her  number and told her I would let her nurse know she is requesting the refill. She also said her heart was hurting and I asked if she would like to speak with a nurse she said no. Then she stated if she does not get her medication filled and ends up in the hospital it will not be her fault for not refilling her medication. I told her I was putting the medication refill in but still asked if she could call the provider that prescribed the medication office as well. I let her know I will note her account and express she did denty speaking with a nurse she then asked if she can speak with one.     Fwd this to Nurse Angeli for further assistance.

## 2025-05-22 NOTE — TELEPHONE ENCOUNTER
Rx #: [id number]  amiodarone (Pacerone) 200 mg tablet [[id number]]      Order Details  Dose, Route, Frequency: As Directed   Dispense Quantity: 78 tablet (12 day supply) Refills: 0    Duration: 42 days Dispense As Written: No    Indications of Use: atrial fibrillation         Sig: Take 2 tablets (400 mg) by mouth 2 times a day for 12 days, THEN 1 tablet (200 mg) once daily.         Start Date: 25 End Date: 25   Written Date: 25     She called and was given this in the hospital and she needs a refill on please send to [facility] in [place]

## 2025-05-22 NOTE — TELEPHONE ENCOUNTER
Message for Jacqueline to review not on med list and has not been given by jacqueline. Per patient was given this in the hospital

## 2025-05-23 ENCOUNTER — TELEPHONE (OUTPATIENT)
Dept: CARDIOLOGY | Facility: HOSPITAL | Age: 50
End: 2025-05-23
Payer: MEDICAID

## 2025-05-23 NOTE — TELEPHONE ENCOUNTER
Patient called in and LVM stating that she had a cardioversion on Monday in the ER with dr. Meza now since her stomach has been filling up with gas and when she sits up this gas bubble goes up and it has an egg taste and smell. (States she didn't eat any eggs)     Patient has questions and concerns if this is normal.     Routing to Angeli RN for support and call back.    Thank you!  Colby ALTAMIRANO

## 2025-06-02 ENCOUNTER — CLINICAL SUPPORT (OUTPATIENT)
Dept: SLEEP MEDICINE | Facility: CLINIC | Age: 50
End: 2025-06-02
Payer: MEDICAID

## 2025-06-02 VITALS
WEIGHT: 282.19 LBS | SYSTOLIC BLOOD PRESSURE: 117 MMHG | HEIGHT: 64 IN | DIASTOLIC BLOOD PRESSURE: 78 MMHG | BODY MASS INDEX: 48.18 KG/M2

## 2025-06-02 DIAGNOSIS — G47.33 OBSTRUCTIVE SLEEP APNEA (ADULT) (PEDIATRIC): ICD-10-CM

## 2025-06-02 DIAGNOSIS — R29.818 SUSPECTED SLEEP APNEA: ICD-10-CM

## 2025-06-02 PROCEDURE — 95810 POLYSOM 6/> YRS 4/> PARAM: CPT | Performed by: INTERNAL MEDICINE

## 2025-06-02 ASSESSMENT — SLEEP AND FATIGUE QUESTIONNAIRES
HOW LIKELY ARE YOU TO NOD OFF OR FALL ASLEEP WHILE SITTING AND READING: HIGH CHANCE OF DOZING
HOW LIKELY ARE YOU TO NOD OFF OR FALL ASLEEP WHILE SITTING QUIETLY AFTER LUNCH WITHOUT ALCOHOL: MODERATE CHANCE OF DOZING
HOW LIKELY ARE YOU TO NOD OFF OR FALL ASLEEP WHEN YOU ARE A PASSENGER IN A CAR FOR AN HOUR WITHOUT A BREAK: MODERATE CHANCE OF DOZING
HOW LIKELY ARE YOU TO NOD OFF OR FALL ASLEEP WHILE WATCHING TV: HIGH CHANCE OF DOZING
HOW LIKELY ARE YOU TO NOD OFF OR FALL ASLEEP IN A CAR, WHILE STOPPED FOR A FEW MINUTES IN TRAFFIC: MODERATE CHANCE OF DOZING
ESS-CHAD TOTAL SCORE: 17
SITING INACTIVE IN A PUBLIC PLACE LIKE A CLASS ROOM OR A MOVIE THEATER: HIGH CHANCE OF DOZING
HOW LIKELY ARE YOU TO NOD OFF OR FALL ASLEEP WHILE SITTING AND TALKING TO SOMEONE: SLIGHT CHANCE OF DOZING
HOW LIKELY ARE YOU TO NOD OFF OR FALL ASLEEP WHILE LYING DOWN TO REST IN THE AFTERNOON WHEN CIRCUMSTANCES PERMIT: SLIGHT CHANCE OF DOZING

## 2025-06-03 ENCOUNTER — PATIENT MESSAGE (OUTPATIENT)
Dept: PRIMARY CARE | Facility: CLINIC | Age: 50
End: 2025-06-03
Payer: MEDICAID

## 2025-06-03 DIAGNOSIS — K59.00 CONSTIPATION, UNSPECIFIED CONSTIPATION TYPE: Primary | ICD-10-CM

## 2025-06-03 DIAGNOSIS — G47.30 MILD SLEEP APNEA: ICD-10-CM

## 2025-06-03 NOTE — PROGRESS NOTES
STTechnologist: Jaelyn Interiano  [facility] [employer] NOTE:     Patient: Kerry Garcia   MRN//AGE: [id number]  1975  49 y.o.   Technologist: Jaelyn Interiano   Room: [facility]   Service Date: 2025        Sleep Testing Location: [facility]    [employer]ner: 17    TECHNOLOGIST SLEEP STUDY PROCEDURE NOTE:   This sleep study is being conducted according to the policies and procedures outlined by the AAS accreditation standards.  The sleep study procedure and processes involved during this appointment was explained to the patient/patient’s family, questions were answered. The patient/family verbalized understanding.      The patient is a 49 year old female scheduled for aDiagnostic PSG Split night with montage of: standard sleep montage. she arrived for her appointment.      The study that was ultimately completed was aDiagnostic PSG Split night with montage of: standard sleep montage.    The full study Was completed.  Patient questionnaires completed?: yes     Consents signed? yes    Initial Fall Risk Screening:     Kerry has fallen in the last 6 months. her did result in injury. Kerry does not have a fear of falling. He does not need assistance with sitting, standing, or walking. she does not need assistance walking in her home. she does not need assistance in an unfamiliar setting. The patient is notusing an assistive device.     Brief Study observations: 49 yr old female presents for a diagnostic PSG. Pt states she used CPAP 20+yrs ago. She would remove the mask in her sleep. She only used it for 2 months. HOB is elevated 35% to simulate home sleeping. HOB was lowered to 25% at 0200.     Deviation to order/protocol and reason: NA      If PAP, which was preferred mask/pressure/mode: NA      Other:None    After the procedure, the patient/family was informed to ensure followup with ordering clinician for testing results.      Technologist: Jaelyn Interiano

## 2025-06-04 RX ORDER — LACTULOSE 10 G/15ML
10 SOLUTION ORAL 2 TIMES DAILY PRN
Qty: 900 ML | Refills: 1 | Status: SHIPPED | OUTPATIENT
Start: 2025-06-04 | End: 2025-08-03

## 2025-06-06 PROCEDURE — 99283 EMERGENCY DEPT VISIT LOW MDM: CPT | Performed by: EMERGENCY MEDICINE

## 2025-06-06 ASSESSMENT — PAIN DESCRIPTION - LOCATION: LOCATION: LEG

## 2025-06-06 ASSESSMENT — PAIN DESCRIPTION - ORIENTATION: ORIENTATION: LEFT

## 2025-06-06 ASSESSMENT — PAIN - FUNCTIONAL ASSESSMENT: PAIN_FUNCTIONAL_ASSESSMENT: 0-10

## 2025-06-06 ASSESSMENT — PAIN SCALES - GENERAL: PAINLEVEL_OUTOF10: 10 - WORST POSSIBLE PAIN

## 2025-06-06 ASSESSMENT — PAIN DESCRIPTION - PAIN TYPE: TYPE: ACUTE PAIN

## 2025-06-07 ENCOUNTER — APPOINTMENT (OUTPATIENT)
Dept: RADIOLOGY | Facility: HOSPITAL | Age: 50
End: 2025-06-07
Payer: MEDICAID

## 2025-06-07 ENCOUNTER — HOSPITAL ENCOUNTER (EMERGENCY)
Facility: HOSPITAL | Age: 50
Discharge: HOME | End: 2025-06-07
Attending: EMERGENCY MEDICINE
Payer: MEDICAID

## 2025-06-07 VITALS
RESPIRATION RATE: 19 BRPM | BODY MASS INDEX: 44.39 KG/M2 | HEIGHT: 64 IN | TEMPERATURE: 97.9 F | HEART RATE: 80 BPM | SYSTOLIC BLOOD PRESSURE: 130 MMHG | OXYGEN SATURATION: 99 % | WEIGHT: 260 LBS | DIASTOLIC BLOOD PRESSURE: 95 MMHG

## 2025-06-07 DIAGNOSIS — M25.562 ACUTE PAIN OF LEFT KNEE: Primary | ICD-10-CM

## 2025-06-07 DIAGNOSIS — M89.9 LESION OF BONE OF KNEE: ICD-10-CM

## 2025-06-07 PROCEDURE — 73564 X-RAY EXAM KNEE 4 OR MORE: CPT | Mod: LEFT SIDE | Performed by: RADIOLOGY

## 2025-06-07 PROCEDURE — 73564 X-RAY EXAM KNEE 4 OR MORE: CPT | Mod: LT

## 2025-06-07 PROCEDURE — 2500000001 HC RX 250 WO HCPCS SELF ADMINISTERED DRUGS (ALT 637 FOR MEDICARE OP): Performed by: EMERGENCY MEDICINE

## 2025-06-07 PROCEDURE — 2500000001 HC RX 250 WO HCPCS SELF ADMINISTERED DRUGS (ALT 637 FOR MEDICARE OP): Performed by: STUDENT IN AN ORGANIZED HEALTH CARE EDUCATION/TRAINING PROGRAM

## 2025-06-07 RX ORDER — OXYCODONE HYDROCHLORIDE 5 MG/1
5 TABLET ORAL ONCE
Refills: 0 | Status: COMPLETED | OUTPATIENT
Start: 2025-06-07 | End: 2025-06-07

## 2025-06-07 RX ORDER — OXYCODONE AND ACETAMINOPHEN 5; 325 MG/1; MG/1
1 TABLET ORAL ONCE
Refills: 0 | Status: COMPLETED | OUTPATIENT
Start: 2025-06-07 | End: 2025-06-07

## 2025-06-07 RX ORDER — DICLOFENAC SODIUM 10 MG/G
4 GEL TOPICAL 4 TIMES DAILY
Qty: 160 G | Refills: 0 | Status: SHIPPED | OUTPATIENT
Start: 2025-06-07 | End: 2025-06-07

## 2025-06-07 RX ORDER — OXYCODONE HYDROCHLORIDE 5 MG/1
5 TABLET ORAL EVERY 6 HOURS PRN
Qty: 8 TABLET | Refills: 0 | Status: SHIPPED | OUTPATIENT
Start: 2025-06-07 | End: 2025-06-10

## 2025-06-07 RX ORDER — DICLOFENAC SODIUM 10 MG/G
4 GEL TOPICAL 4 TIMES DAILY
Qty: 160 G | Refills: 0 | Status: SHIPPED | OUTPATIENT
Start: 2025-06-07 | End: 2025-06-17

## 2025-06-07 RX ADMIN — OXYCODONE HYDROCHLORIDE AND ACETAMINOPHEN 1 TABLET: 5; 325 TABLET ORAL at 00:54

## 2025-06-07 RX ADMIN — OXYCODONE HYDROCHLORIDE 5 MG: 5 TABLET ORAL at 02:37

## 2025-06-07 ASSESSMENT — COLUMBIA-SUICIDE SEVERITY RATING SCALE - C-SSRS
2. HAVE YOU ACTUALLY HAD ANY THOUGHTS OF KILLING YOURSELF?: NO
6. HAVE YOU EVER DONE ANYTHING, STARTED TO DO ANYTHING, OR PREPARED TO DO ANYTHING TO END YOUR LIFE?: NO
1. IN THE PAST MONTH, HAVE YOU WISHED YOU WERE DEAD OR WISHED YOU COULD GO TO SLEEP AND NOT WAKE UP?: NO

## 2025-06-07 NOTE — DISCHARGE INSTRUCTIONS
Do not drive or operate heavy machinery within 8 hours of taking opioid pain medication.    Please call your primary care provider for follow-up in the next 2 days.  You have been given a referral to orthopedic surgery as well, they should be calling you early this week to schedule follow-up appointment as well.    Return to the emergency department with any worsening symptoms including new trauma or injury, inability to bear weight, high fevers, or other concerns.

## 2025-06-07 NOTE — PROGRESS NOTES
Emergency Medicine Transition of Care Note.    I received Kerry Garcia in signout from Dr. Meyers.  Please see the previous ED provider note for all HPI, PE and MDM up to the time of signout. This is in addition to the primary record.    In brief Kerry Garcia is an 49 y.o. female presenting for   Chief Complaint   Patient presents with    other     Left leg injury        ED Course as of 06/07/25 0739   Sat Jun 07, 2025 0234 Discussed XR findings with orthopedic surgeon Dr. Harper, who recommends outpatient follow up. [JG]      ED Course User Index  [JG] Michell Solomon MD         Diagnoses as of 06/07/25 0739   Acute pain of left knee   Lesion of bone of knee       Medical Decision Making  Patient here for left knee pain after a fall one week ago. States she landed directly on her knee. She has been able to ambulate with some pain. Denies repeat falls or injuries. Patient is on eliquis with a notable hematoma to the left knee. Compartments soft. XR showing no evidence of bony fracture, does show some mild soft tissue swelling, osteoarthritis and an intramedullar sclerotic lesion in distal femoral diaphysis, suspected bone infarct versus chondroid lesion. Discussed findings with orthopedic surgeon Dr. Harper who states patient can follow up outpatient for these findings. Patient able to ambulate without assistance with mild limp.     Final diagnoses:   [M25.562] Acute pain of left knee   [M89.9] Lesion of bone of knee           Procedure  Procedures    Michell Solomon MD

## 2025-06-07 NOTE — ED NOTES
Pt OK for d/c home per provider. All results and findings discussed with patient by provider. Home care and follow up instructions provided to patient. All questions answered. Pt verbalized understanding of all information. Pt A&Ox4, resp easy, skin warm dry and of appropriate color. Departs ED with steady gait.      Cheli Christine RN  06/07/25 [id number]

## 2025-06-07 NOTE — ED TRIAGE NOTES
Patient arrived via triage for complaint of left knee pain after a fall about a week ago. Denies any LOC or hitting head, is on eliquis. Patient knee bruised and swollen on arrival,  able to wiggle toes with bilateral pedal pulses. Patient iced knee yesterday but not today and took medication for pain earlier in the day

## 2025-06-07 NOTE — ED PROVIDER NOTES
HPI   Chief Complaint   Patient presents with    other     Left leg injury       Presents to the emergency department secondary to left knee pain.  Patient has had left knee pain since falling 1 week ago.  Patient states that she was intoxicated when she fell.  She landed directly onto her knee.  No head injury.  No neck pain.  No chest pain or shortness of breath.  No abdominal pain.  No nausea or vomiting.  Patient is currently on Eliquis for history of atrial fibrillation and also PE in the past.                          Uri Coma Scale Score: 15                  Patient History   Medical History[1]  Surgical History[2]  Family History[3]  Social History[4]    Physical Exam   ED Triage Vitals [06/06/25 2359]   Temperature Heart Rate Respirations BP   36.6 °C (97.9 °F) 76 18 (!) 174/98      Pulse Ox Temp src Heart Rate Source Patient Position   100 % -- -- --      BP Location FiO2 (%)     -- --       Physical Exam  Vitals and nursing note reviewed.   Constitutional:       Appearance: Normal appearance.   HENT:      Head: Normocephalic.      Nose: Nose normal.      Mouth/Throat:      Mouth: Mucous membranes are moist.   Eyes:      Extraocular Movements: Extraocular movements intact.      Pupils: Pupils are equal, round, and reactive to light.   Cardiovascular:      Rate and Rhythm: Normal rate and regular rhythm.      Pulses: Normal pulses.      Heart sounds: Normal heart sounds.   Pulmonary:      Effort: Pulmonary effort is normal.      Breath sounds: Normal breath sounds.   Abdominal:      General: Abdomen is flat. Bowel sounds are normal.      Palpations: Abdomen is soft.   Musculoskeletal:      Cervical back: Normal range of motion.      Comments: Patient has diffuse ecchymosis of the left knee and lower extremity.  Limited range of motion secondary to pain.   Skin:     General: Skin is warm and dry.      Capillary Refill: Capillary refill takes less than 2 seconds.   Neurological:      General: No focal  deficit present.      Mental Status: She is alert and oriented to person, place, and time.   Psychiatric:         Mood and Affect: Mood normal.         Behavior: Behavior normal.       Labs Reviewed - No data to display  Pain Management Panel  More data may exist         Latest Ref Rng & Units 7/15/2021 7/22/2020   Pain Management Panel   Amphetamine Screen, Urine NEGATIVE PRESUMPTIVE NEGATIVE  PRESUMPTIVE NEGATIVE    Barbiturate Screen, Urine NEGATIVE PRESUMPTIVE NEGATIVE  PRESUMPTIVE NEGATIVE    Fentanyl Screen, Urine NEGATIVE PRESUMPTIVE NEGATIVE  -   Methadone Screen, Urine NEGATIVE PRESUMPTIVE NEGATIVE  PRESUMPTIVE NEGATIVE      No orders to display     ED Course & MDM   ED Course as of 06/07/25 1514   Sat Jun 07, 2025   0234 Discussed XR findings with orthopedic surgeon Dr. Harper, who recommends outpatient follow up. [JG]      ED Course User Index  [JG] Michell Solomon MD         Diagnoses as of 06/07/25 1514   Acute pain of left knee   Lesion of bone of knee       Medical Decision Making  Patient presents secondary to knee pain after a fall.  Patient is evaluated in the emergency department for fracture.  X-ray of the knee is obtained.  Patient is given Percocet for pain relief.  Patient's x-rays are pending at the time of signout.  Patient was signed out to the oncoming physician for reevaluation and disposition.        Procedure  Procedures       [1]   Past Medical History:  Diagnosis Date    Amenorrhea 11/15/2022    Anxiety     Body mass index (BMI)40.0-44.9, adult 10/19/2020    Body mass index (BMI) of 40.0 to 44.9 in adult    Cervicalgia 05/31/2022    Acute neck pain    Chronic cough 05/09/2016    Persistent cough for 3 weeks or longer    Cough 05/17/2023    Depression     Encounter for follow-up examination after completed treatment for conditions other than malignant neoplasm 03/18/2021    Hospital discharge follow-up    Headache     Localized edema 10/29/2020    Edema leg    Other conditions  influencing health status 2022    History of cough    Other conditions influencing health status 2021    History of cough    Pelvic pain 2023    Personal history of other diseases of the nervous system and sense organs     History of sleep apnea    Personal history of other diseases of the respiratory system 2016    History of acute bronchitis    Personal history of other endocrine, nutritional and metabolic disease 2021    History of endocrine disorder    Personal history of other endocrine, nutritional and metabolic disease 2022    History of morbid obesity    Personal history of other mental and behavioral disorders     History of anxiety    Personal history of other specified conditions     History of edema    Polyphagia 2023   [2]   Past Surgical History:  Procedure Laterality Date     SECTION, CLASSIC  2013     Section    CHOLECYSTECTOMY  2013    Cholecystectomy Laparoscopic    DENTAL SURGERY      OTHER SURGICAL HISTORY  2022    Ankle surgery   [3]   Family History  Problem Relation Name Age of Onset    Diabetes Mother Kitty white     Cancer Mother Kitty salgado     Anxiety disorder Mother Kitty salgado     Ovarian cancer Mother Kitty salgado 42    Other (vertigo) Father Mother     Diabetes Father Mother     Neuropathy Father Mother     Other (pad) Father Mother     Other (cardiac disease) Paternal Grandmother     [4]   Social History  Tobacco Use    Smoking status: Some Days     Current packs/day: 1.00     Average packs/day: 1 pack/day for 1 year (1.0 ttl pk-yrs)     Types: Cigarettes    Smokeless tobacco: Never    Tobacco comments:     Vape-sometimes on the weekends   Vaping Use    Vaping status: Some Days   Substance Use Topics    Alcohol use: Yes     Alcohol/week: 10.0 standard drinks of alcohol     Types: 10 Shots of liquor per week     Comment: sometimes    Drug use: Never        Oralia Meyers MD  [phone]

## 2025-06-10 ENCOUNTER — TELEPHONE (OUTPATIENT)
Dept: PRIMARY CARE | Facility: CLINIC | Age: 50
End: 2025-06-10
Payer: MEDICAID

## 2025-06-10 NOTE — TELEPHONE ENCOUNTER
Patient called in wondering if she should go to the ER she sent you pictures and said that the dark spot closest to the ankle has a big lump? She said the pain is terrible where that lump is and really swollen. She will have to go the [facility]. She takes her Eliquis every morning and night.  Please advise

## 2025-06-19 NOTE — PROGRESS NOTES
Electrophysiology Follow up Visit    History of Present Illness:  Kerry Garcia is a 49 y.o. year old female patient with history of atrial tachycardia, atrial fibrillation, tachycardia induced cardiomyopathy, atrial flutter, hypertension, JOSE, DVT/PE    Patient was originally referred to electrophysiology for atrial arrhythmias in 2023.  Patient was hospitalized in January 2023 with tachycardia.  Reviewed notes mentions paroxysmal atrial fibrillation during hospitalization and during stress test though unable to review EKGs to confirm.  Patient was noted to have SVT (atrial tachycardia/flutter) and started on amiodarone briefly then switched to flecainide. She was hospitalized in March 2025 with persistent atrial tachycardia.  Her long-term flecainide was discontinued and she was started on amiodarone.  Echocardiogram showed LVEF of 40-45%, perhaps tachycardia induced cardiomyopathy.  She continued to noticed palpitations since amiodarone was started in March.    Patient evaluated by Dr. Meza in May 2025.   Patient had remote RVOT PVCs seen on prior EKGs (2007) also with a presumed low atrial tachycardia. Atrial tachycardia likely low atrial tachycardia arising from near the coronary sinus os, although an ultra slow reentrant atrial flutter cannot be excluded.  Patient was continued on OAC though given the slow rate of the arrhythmia technically not to be thrombogenic.  There was history of rapid response atrial flutter in the past also.  [id number] score of at least 3 (gender, HTN, HF).  She was scheduled for cardioversion.  Plan to refer to Dr. Gray for full pulmonary vein isolation and possible mapping of her low atrial foci as well.    5/28/2025 successful cardioversion from atrial tachycardia to sinus rhythm    Patient here for follow-up for atrial tachycardia and atrial fibrillation.  Patient reports she has been feeling well since last visit and her cardioversion.  Patient denies palpitations,  syncope or near syncope. She is compliant with daily medications and denies any bleeding concerns on OAC.       Medical History:  She has a past medical history of Amenorrhea (11/15/2022), Anxiety, Body mass index (BMI)40.0-44.9, adult (10/19/2020), Cervicalgia (2022), Chronic cough (2016), Cough (2023), Depression, Encounter for follow-up examination after completed treatment for conditions other than malignant neoplasm (2021), Headache, Localized edema (10/29/2020), Other conditions influencing health status (2022), Other conditions influencing health status (2021), Pelvic pain (2023), Personal history of other diseases of the nervous system and sense organs, Personal history of other diseases of the respiratory system (2016), Personal history of other endocrine, nutritional and metabolic disease (2021), Personal history of other endocrine, nutritional and metabolic disease (2022), Personal history of other mental and behavioral disorders, Personal history of other specified conditions, and Polyphagia (2023).    Surgical History:  She has a past surgical history that includes Cholecystectomy (2013);  section, classic (2013); Other surgical history (2022); and Dental surgery.     Social History:  She reports that she has been smoking cigarettes. She has a 1 pack-year smoking history. She has never used smokeless tobacco. She reports current alcohol use of about 10.0 standard drinks of alcohol per week. She reports that she does not use drugs.       Family History[1]     ROS:  A 14 point review of systems was done and is negative other than as stated in HPI    Physical Exam  Constitutional:       Appearance: Normal appearance.   Cardiovascular:      Rate and Rhythm: Normal rate and regular rhythm.      Pulses: Normal pulses.      Heart sounds: Normal heart sounds.   Pulmonary:      Effort: Pulmonary effort is normal.       Breath sounds: Normal breath sounds.   Musculoskeletal:         General: Normal range of motion.      Right lower leg: No edema.      Left lower leg: No edema.   Skin:     General: Skin is warm and dry.   Neurological:      Mental Status: She is alert and oriented to person, place, and time.   Psychiatric:         Mood and Affect: Mood normal.       Allergies:  Hydrocodone and Hydrocodone-acetaminophen    Outpatient Medications:  Current Outpatient Medications   Medication Instructions    amiodarone (PACERONE) 200 mg, oral, Daily    apixaban (ELIQUIS) 5 mg, oral, 2 times daily    atorvastatin (LIPITOR) 10 mg, oral, Daily    buPROPion XL (WELLBUTRIN XL) 300 mg, oral, Daily, TAKE 1 TABLET BY MOUTH EVERY DAY IN THE MORNING *DO NOT CRUSH, CHEW OR SPLIT*    dicyclomine (BENTYL) 20 mg    docusate sodium (COLACE) 100 mg, oral, 2 times daily    empagliflozin (JARDIANCE) 10 mg, oral, Daily    gabapentin (NEURONTIN) 300 mg, oral, Nightly, Take at bedtime.    lactulose 10 g, oral, 2 times daily PRN    metoprolol succinate XL (TOPROL-XL) 50 mg, oral, 2 times daily, Do not crush or chew.      Reviewed Labs:  CBC  Lab Results   Component Value Date    WBC 4.8 05/14/2025    HGB 14.7 05/14/2025    HCT 44.5 05/14/2025    MCV 96.5 05/14/2025     05/14/2025      Renal Function Panel  Lab Results   Component Value Date    GLUCOSE 89 05/06/2025     05/06/2025    K 5.1 05/06/2025     05/06/2025    CO2 26 05/06/2025    ANIONGAP 8 05/06/2025    BUN 19 05/06/2025    CREATININE 0.97 05/06/2025    GFRF 83 01/30/2023    CALCIUM 9.3 05/06/2025      CMP  Lab Results   Component Value Date    CALCIUM 9.3 05/06/2025    PROT 7.1 05/06/2025    ALBUMIN 4.4 05/06/2025    AST 21 05/06/2025    ALT 15 05/06/2025    ALKPHOS 74 05/06/2025    BILITOT 0.7 05/06/2025      Mg   Lab Results   Component Value Date    MG 1.96 03/07/2025      Thyroid  Lab Results   Component Value Date    TSH 4.23 05/06/2025        Visit Vitals  /86  "  Pulse 60   Ht 1.626 m (5' 4\")   Wt 118 kg (260 lb)   BMI 44.63 kg/m²   OB Status Postmenopausal   Smoking Status Some Days   BSA 2.31 m²        Cardiac Testing Reviewed Study(s):  Echo (March/2025):   CONCLUSIONS:   1. The left ventricular systolic function is mildly decreased, with a visually estimated ejection fraction of 40-45%.   2. There is global hypokinesis of the left ventricle with minor regional variations.   3. Left ventricular diastolic filling was indeterminate.   4. There is mildly reduced right ventricular systolic function.   5. Right ventricular systolic pressure is within normal limits.  Echo(January/2023):   CONCLUSIONS:   1. Left ventricular systolic function is normal with a 60-65% estimated ejection fraction.  Stress Test (January/2023):   IMPRESSION:  1. Normal stress myocardial perfusion imaging in response to  pharmacologic stress.  2. Well-maintained left ventricular function.  ECGs (reviewed and my interpretation):   4/5/2024 sinus rhythm with rate of 72 bpm, UT 130ms, QRS 82ms  6/23/2025 sinus rhythm with rate of 60 bpm, QTC 458ms    Assessment  Atrial arrhythmias:  Patient admitted in January 2023 with tachycardia.  Noted to have SVT (atrial tachycardia/flutter).?  Atrial fibrillation during hospitalization though unable to review EKGs.  Started on flecainide.  March 2025 patient admitted with persistent atrial tachycardia.  Long-term flecainide was discontinued she was started on amiodarone.  Echocardiogram showed LVEF 40-45%, possibly tachycardia induced  Evaluated by Dr. Meza May 2025.  Atrial tachycardia likely low arising from near the coronary sinus os although ultra slow reentrant atrial flutter cannot be excluded.  Continued on amiodarone at that time  5/28/2025 successful cardioversion from atrial tachycardia sinus rhythm    ECG today personally reviewed which shows sinus rhythm with heart rate 60 bpm with acceptable QTc.  Labs reviewed.  Patient maintaining sinus rhythm " since her cardioversion last month.  Plan to stop amiodarone today and refer patient for ablation.  Patient is hesitant at this time for the ablation since she is feeling well and in sinus rhythm today.  Reviewed that patient may have recurrence of atrial arrhythmia once amiodarone is discontinued and cardiomyopathy was noted in March.  She would like to discuss ablation at our next appointment.  We will stop amiodarone today.  Will continue metoprolol.  Also will continue apixaban.  We will follow-up in 2 months.  We will also repeat echocardiogram for LV surveillance.    Chronic HFrEF:  Echocardiogram March 2025 showed LVEF 40-45%, thought to be tachycardia mediated  Will repeat echocardiogram for LV surveillance  Continues to follow general cardiology    Hypertension:  Controlled    Plan  Stop amiodarone  Continue metoprolol   Echocardiogram for surveillance  Follow up in 2 months      Exclusive of any other services or procedures performed, Anastasia WEISS APRN-CNP , spent 30 minutes in duration for this visit today.  This time consisted of chart review, obtaining history, and/or performing the exam as documented above as well as documenting the clinical information for the encounter in the electronic record, discussing treatment options, plans, and/or goals with patient, family, and/or caregiver, refilling medications, updating the electronic record, ordering medicines, lab work, imaging, referrals, and/or procedures as documented above and communicating with other [facility]. I have discussed the results of laboratory, radiology, and cardiology studies with the patient and their family/caregiver.          [1]   Family History  Problem Relation Name Age of Onset    Diabetes Mother Kitty white     Cancer Mother Kitty white     Anxiety disorder Mother Kitty white     Ovarian cancer Mother Kitty white 42    Other (vertigo) Father Mother     Diabetes Father Mother     Neuropathy Father Mother      Other (pad) Father Mother     Other (cardiac disease) Paternal Grandmother

## 2025-06-20 ENCOUNTER — TELEPHONE (OUTPATIENT)
Dept: CARDIOLOGY | Facility: HOSPITAL | Age: 50
End: 2025-06-20
Payer: MEDICAID

## 2025-06-20 DIAGNOSIS — I51.9 LV DYSFUNCTION: ICD-10-CM

## 2025-06-20 DIAGNOSIS — K59.00 CONSTIPATION, UNSPECIFIED CONSTIPATION TYPE: ICD-10-CM

## 2025-06-20 DIAGNOSIS — E78.00 ELEVATED LDL CHOLESTEROL LEVEL: ICD-10-CM

## 2025-06-20 DIAGNOSIS — F41.1 GENERALIZED ANXIETY DISORDER: ICD-10-CM

## 2025-06-20 DIAGNOSIS — I48.11 LONGSTANDING PERSISTENT ATRIAL FIBRILLATION (MULTI): ICD-10-CM

## 2025-06-20 DIAGNOSIS — I82.4Y9 DEEP VEIN THROMBOSIS (DVT) OF PROXIMAL LOWER EXTREMITY, UNSPECIFIED CHRONICITY, UNSPECIFIED LATERALITY: ICD-10-CM

## 2025-06-20 RX ORDER — METOPROLOL SUCCINATE 50 MG/1
50 TABLET, EXTENDED RELEASE ORAL 2 TIMES DAILY
Qty: 180 TABLET | Refills: 3 | Status: SHIPPED | OUTPATIENT
Start: 2025-06-20 | End: 2026-06-15

## 2025-06-20 NOTE — TELEPHONE ENCOUNTER
She is no longer using [facility] pharmacy --she would like like all her medications sent to [facility] in [place]    Jardiance 10 mg , Colace 100 mg , Lactulose 20 gr , Atorvastatin 10 mg , Bupropion 300 mg  & Eliquis 5 mg

## 2025-06-20 NOTE — TELEPHONE ENCOUNTER
Called patient to review medications for upcoming appointment with EP Providers: Anastasia WILKINS CNP. Left voicemail with appointment reminder and to bring an updated list of medications to appointment.

## 2025-06-20 NOTE — TELEPHONE ENCOUNTER
Patient needs metoprolol succinate XL (Toprol-XL) 50 mg 24 hr tablet sent to [facility] [address] [place] [id number] she is completely out      She said she has 25mg at home and if it can't be filled will she be ok to take two of them ?

## 2025-06-21 RX ORDER — BUPROPION HYDROCHLORIDE 300 MG/1
300 TABLET ORAL DAILY
Qty: 90 TABLET | Refills: 3 | Status: SHIPPED | OUTPATIENT
Start: 2025-06-21 | End: 2026-06-16

## 2025-06-21 RX ORDER — ATORVASTATIN CALCIUM 10 MG/1
10 TABLET, FILM COATED ORAL DAILY
Qty: 90 TABLET | Refills: 3 | Status: SHIPPED | OUTPATIENT
Start: 2025-06-21 | End: 2026-06-16

## 2025-06-21 RX ORDER — LACTULOSE 10 G/15ML
10 SOLUTION ORAL 2 TIMES DAILY PRN
Qty: 900 ML | Refills: 1 | Status: SHIPPED | OUTPATIENT
Start: 2025-06-21 | End: 2025-08-20

## 2025-06-21 RX ORDER — DOCUSATE SODIUM 100 MG/1
100 CAPSULE, LIQUID FILLED ORAL 2 TIMES DAILY
Qty: 30 CAPSULE | Refills: 10 | Status: SHIPPED | OUTPATIENT
Start: 2025-06-21

## 2025-06-23 ENCOUNTER — OFFICE VISIT (OUTPATIENT)
Dept: CARDIOLOGY | Facility: HOSPITAL | Age: 50
End: 2025-06-23
Payer: MEDICAID

## 2025-06-23 VITALS
HEART RATE: 60 BPM | DIASTOLIC BLOOD PRESSURE: 86 MMHG | SYSTOLIC BLOOD PRESSURE: 138 MMHG | BODY MASS INDEX: 44.39 KG/M2 | WEIGHT: 260 LBS | HEIGHT: 64 IN

## 2025-06-23 DIAGNOSIS — I48.92 ATRIAL FLUTTER, UNSPECIFIED TYPE (MULTI): ICD-10-CM

## 2025-06-23 DIAGNOSIS — I48.0 PAROXYSMAL ATRIAL FIBRILLATION (MULTI): ICD-10-CM

## 2025-06-23 DIAGNOSIS — I51.9 LV DYSFUNCTION: ICD-10-CM

## 2025-06-23 DIAGNOSIS — I47.19 ATRIAL TACHYCARDIA: Primary | ICD-10-CM

## 2025-06-23 LAB
ATRIAL RATE: 60 BPM
P AXIS: 24 DEGREES
P OFFSET: 173 MS
P ONSET: 131 MS
PR INTERVAL: 158 MS
Q ONSET: 210 MS
QRS COUNT: 10 BEATS
QRS DURATION: 90 MS
QT INTERVAL: 458 MS
QTC CALCULATION(BAZETT): 458 MS
QTC FREDERICIA: 458 MS
R AXIS: -19 DEGREES
T AXIS: 45 DEGREES
T OFFSET: 439 MS
VENTRICULAR RATE: 60 BPM

## 2025-06-23 PROCEDURE — 3008F BODY MASS INDEX DOCD: CPT | Performed by: NURSE PRACTITIONER

## 2025-06-23 PROCEDURE — 3075F SYST BP GE 130 - 139MM HG: CPT | Performed by: NURSE PRACTITIONER

## 2025-06-23 PROCEDURE — 93005 ELECTROCARDIOGRAM TRACING: CPT | Performed by: NURSE PRACTITIONER

## 2025-06-23 PROCEDURE — 93010 ELECTROCARDIOGRAM REPORT: CPT | Performed by: INTERNAL MEDICINE

## 2025-06-23 PROCEDURE — 4004F PT TOBACCO SCREEN RCVD TLK: CPT | Performed by: NURSE PRACTITIONER

## 2025-06-23 PROCEDURE — 99213 OFFICE O/P EST LOW 20 MIN: CPT | Performed by: NURSE PRACTITIONER

## 2025-06-23 PROCEDURE — 99214 OFFICE O/P EST MOD 30 MIN: CPT | Performed by: NURSE PRACTITIONER

## 2025-06-23 PROCEDURE — 3079F DIAST BP 80-89 MM HG: CPT | Performed by: NURSE PRACTITIONER

## 2025-07-01 ENCOUNTER — TELEPHONE (OUTPATIENT)
Dept: CARDIOLOGY | Facility: HOSPITAL | Age: 50
End: 2025-07-01
Payer: MEDICAID

## 2025-07-01 NOTE — TELEPHONE ENCOUNTER
LVM for pt to schedule echo ordered by Anastasia MENDIOLA. Saved spot on 7/31 11am and requested pt return my call to confirm.

## 2025-07-03 DIAGNOSIS — G89.29 OTHER CHRONIC PAIN: ICD-10-CM

## 2025-07-03 RX ORDER — GABAPENTIN 300 MG/1
300 CAPSULE ORAL
Qty: 30 CAPSULE | Refills: 11 | Status: SHIPPED | OUTPATIENT
Start: 2025-07-03

## 2025-07-03 NOTE — TELEPHONE ENCOUNTER
Requested Prescriptions     Pending Prescriptions Disp Refills    gabapentin (Neurontin) 300 mg capsule [Pharmacy Med Name: GABAPENTIN 300 MG CAPS 300 Capsule] 30 capsule 11     Sig: TAKE 1 CAPSULE BY MOUTH EVERY DAY AT BEDTIME     Lov 5/6/25    Nov 8/12/25

## 2025-07-25 DIAGNOSIS — I10 BENIGN ESSENTIAL HYPERTENSION: Primary | ICD-10-CM

## 2025-07-25 RX ORDER — METOPROLOL TARTRATE 25 MG/1
25 TABLET, FILM COATED ORAL
Qty: 60 TABLET | Refills: 11 | Status: SHIPPED | OUTPATIENT
Start: 2025-07-25

## 2025-07-29 ENCOUNTER — APPOINTMENT (OUTPATIENT)
Dept: PRIMARY CARE | Facility: CLINIC | Age: 50
End: 2025-07-29
Payer: MEDICAID

## 2025-08-07 DIAGNOSIS — E78.00 ELEVATED LDL CHOLESTEROL LEVEL: ICD-10-CM

## 2025-08-07 DIAGNOSIS — E66.813 CLASS 3 SEVERE OBESITY WITH SERIOUS COMORBIDITY AND BODY MASS INDEX (BMI) OF 45.0 TO 49.9 IN ADULT, UNSPECIFIED OBESITY TYPE: ICD-10-CM

## 2025-08-07 DIAGNOSIS — I82.4Y9 DEEP VEIN THROMBOSIS (DVT) OF PROXIMAL LOWER EXTREMITY, UNSPECIFIED CHRONICITY, UNSPECIFIED LATERALITY: ICD-10-CM

## 2025-08-07 DIAGNOSIS — I48.0 PAROXYSMAL ATRIAL FIBRILLATION (MULTI): ICD-10-CM

## 2025-08-07 DIAGNOSIS — E53.8 VITAMIN B12 DEFICIENCY: ICD-10-CM

## 2025-08-07 DIAGNOSIS — E55.9 VITAMIN D DEFICIENCY: ICD-10-CM

## 2025-08-12 ENCOUNTER — APPOINTMENT (OUTPATIENT)
Dept: PRIMARY CARE | Facility: CLINIC | Age: 50
End: 2025-08-12
Payer: MEDICAID

## 2025-08-12 DIAGNOSIS — K59.00 CONSTIPATION, UNSPECIFIED CONSTIPATION TYPE: ICD-10-CM

## 2025-08-12 RX ORDER — DOCUSATE SODIUM 100 MG/1
100 CAPSULE, LIQUID FILLED ORAL 2 TIMES DAILY
Qty: 30 CAPSULE | Refills: 10 | Status: SHIPPED | OUTPATIENT
Start: 2025-08-12

## 2025-08-21 ENCOUNTER — TELEPHONE (OUTPATIENT)
Dept: CARDIOLOGY | Facility: HOSPITAL | Age: 50
End: 2025-08-21
Payer: MEDICAID

## 2025-08-22 ENCOUNTER — APPOINTMENT (OUTPATIENT)
Dept: CARDIOLOGY | Facility: HOSPITAL | Age: 50
End: 2025-08-22
Payer: MEDICAID

## 2025-08-22 DIAGNOSIS — I48.0 PAROXYSMAL ATRIAL FIBRILLATION (MULTI): ICD-10-CM

## 2025-09-15 ENCOUNTER — APPOINTMENT (OUTPATIENT)
Dept: CARDIOLOGY | Facility: HOSPITAL | Age: 50
End: 2025-09-15
Payer: MEDICAID

## (undated) DEVICE — GOWN,BREATHABLE,IMP SLV 3XL AURORA: Brand: MEDLINE

## (undated) DEVICE — SYRINGE,CONTROL,LL,FINGER,GRIP: Brand: MEDLINE INDUSTRIES, INC.

## (undated) DEVICE — CATHETER ETER URETH 30FR BLLN 5CC LTX 2 W F BARDX LUB

## (undated) DEVICE — BASIC SINGLE BASIN 1-LF: Brand: MEDLINE INDUSTRIES, INC.

## (undated) DEVICE — INSTRUMENT SET DENTAL HOUSE

## (undated) DEVICE — MARKER,SKIN,WI/RULER AND LABELS: Brand: MEDLINE

## (undated) DEVICE — BLADE,STAINLESS-STEEL,15,STRL,DISPOSABLE: Brand: MEDLINE

## (undated) DEVICE — YANKAUER,OPEN TIP,W/O VENT,STERILE: Brand: MEDLINE INDUSTRIES, INC.

## (undated) DEVICE — 4-PORT MANIFOLD: Brand: NEPTUNE 2

## (undated) DEVICE — GLOVE SURG SZ 8 CRM LTX FREE POLYISOPRENE POLYMER BEAD ANTI

## (undated) DEVICE — COVER HNDL LT DISP

## (undated) DEVICE — NEEDLE HYPO 25GA L1.5IN BLU POLYPR HUB S STL REG BVL STR

## (undated) DEVICE — SURGICAL PROCEDURE PACK EENT CUST

## (undated) DEVICE — JELLY LUBRICATING 5G PETRO

## (undated) DEVICE — TUBING SUCT 12FR MAL ALUM SHFT FN CAP VENT UNIV CONN W/ OBT

## (undated) DEVICE — SYRINGE,EAR/ULCER, 2 OZ, STERILE: Brand: MEDLINE

## (undated) DEVICE — TOWEL,OR,DSP,ST,BLUE,STD,6/PK,12PK/CS: Brand: MEDLINE

## (undated) DEVICE — COVER,LIGHT HANDLE,FLX,2/PK: Brand: MEDLINE INDUSTRIES, INC.